# Patient Record
Sex: MALE | Race: WHITE | NOT HISPANIC OR LATINO | Employment: OTHER | URBAN - METROPOLITAN AREA
[De-identification: names, ages, dates, MRNs, and addresses within clinical notes are randomized per-mention and may not be internally consistent; named-entity substitution may affect disease eponyms.]

---

## 2017-01-30 ENCOUNTER — TRANSCRIBE ORDERS (OUTPATIENT)
Dept: ADMINISTRATIVE | Age: 72
End: 2017-01-30

## 2017-01-30 ENCOUNTER — HOSPITAL ENCOUNTER (OUTPATIENT)
Dept: RADIOLOGY | Age: 72
Discharge: HOME | End: 2017-01-30
Payer: MEDICARE

## 2017-01-30 DIAGNOSIS — J18.9 UNRESOLVED PNEUMONIA: ICD-10-CM

## 2017-01-30 DIAGNOSIS — J18.9 UNRESOLVED PNEUMONIA: Primary | ICD-10-CM

## 2017-01-30 PROCEDURE — 71020 HB CHEST X-RAY 2VW FRONTAL&LATL: CPT

## 2017-02-20 ENCOUNTER — APPOINTMENT (OUTPATIENT)
Dept: LAB | Age: 72
End: 2017-02-20
Payer: MEDICARE

## 2017-02-20 ENCOUNTER — TRANSCRIBE ORDERS (OUTPATIENT)
Dept: ADMINISTRATIVE | Age: 72
End: 2017-02-20

## 2017-02-20 DIAGNOSIS — C61 MALIGNANT NEOPLASM OF PROSTATE (HCC): ICD-10-CM

## 2017-02-20 LAB — PSA SERPL-MCNC: <0.1 NG/ML (ref 0–4)

## 2017-02-20 PROCEDURE — 84153 ASSAY OF PSA TOTAL: CPT

## 2017-02-20 PROCEDURE — 36415 COLL VENOUS BLD VENIPUNCTURE: CPT

## 2017-05-30 ENCOUNTER — TRANSCRIBE ORDERS (OUTPATIENT)
Dept: ADMINISTRATIVE | Age: 72
End: 2017-05-30

## 2017-05-30 ENCOUNTER — APPOINTMENT (OUTPATIENT)
Dept: LAB | Age: 72
End: 2017-05-30
Payer: MEDICARE

## 2017-05-30 DIAGNOSIS — C61 MALIGNANT NEOPLASM OF PROSTATE (HCC): ICD-10-CM

## 2017-05-30 LAB — PSA SERPL-MCNC: <0.1 NG/ML (ref 0–4)

## 2017-05-30 PROCEDURE — 36415 COLL VENOUS BLD VENIPUNCTURE: CPT

## 2017-05-30 PROCEDURE — 84153 ASSAY OF PSA TOTAL: CPT

## 2017-06-07 ENCOUNTER — GENERIC CONVERSION - ENCOUNTER (OUTPATIENT)
Dept: OTHER | Facility: OTHER | Age: 72
End: 2017-06-07

## 2017-06-07 ENCOUNTER — APPOINTMENT (OUTPATIENT)
Dept: RADIATION ONCOLOGY | Facility: HOSPITAL | Age: 72
End: 2017-06-07
Attending: RADIOLOGY
Payer: MEDICARE

## 2017-06-07 PROCEDURE — 99213 OFFICE O/P EST LOW 20 MIN: CPT | Performed by: RADIOLOGY

## 2017-06-28 ENCOUNTER — TRANSCRIBE ORDERS (OUTPATIENT)
Dept: ADMINISTRATIVE | Facility: HOSPITAL | Age: 72
End: 2017-06-28

## 2017-06-28 DIAGNOSIS — K81.9 CHOLECYSTITIS, UNSPECIFIED: Primary | ICD-10-CM

## 2017-06-29 ENCOUNTER — HOSPITAL ENCOUNTER (OUTPATIENT)
Dept: RADIOLOGY | Age: 72
Discharge: HOME | End: 2017-06-29
Payer: MEDICARE

## 2017-06-29 DIAGNOSIS — K81.9 CHOLECYSTITIS, UNSPECIFIED: ICD-10-CM

## 2017-06-29 PROCEDURE — 76705 ECHO EXAM OF ABDOMEN: CPT

## 2017-07-31 ENCOUNTER — APPOINTMENT (OUTPATIENT)
Dept: LAB | Age: 72
End: 2017-07-31
Payer: MEDICARE

## 2017-07-31 ENCOUNTER — TRANSCRIBE ORDERS (OUTPATIENT)
Dept: ADMINISTRATIVE | Age: 72
End: 2017-07-31

## 2017-07-31 DIAGNOSIS — C61 MALIGNANT NEOPLASM OF PROSTATE (HCC): ICD-10-CM

## 2017-07-31 LAB — PSA SERPL-MCNC: <0.1 NG/ML (ref 0–4)

## 2017-07-31 PROCEDURE — 36415 COLL VENOUS BLD VENIPUNCTURE: CPT

## 2017-07-31 PROCEDURE — 84153 ASSAY OF PSA TOTAL: CPT

## 2017-08-03 ENCOUNTER — ALLSCRIPTS OFFICE VISIT (OUTPATIENT)
Dept: OTHER | Facility: OTHER | Age: 72
End: 2017-08-03

## 2017-11-30 DIAGNOSIS — C61 MALIGNANT NEOPLASM OF PROSTATE (HCC): ICD-10-CM

## 2017-12-08 ENCOUNTER — APPOINTMENT (OUTPATIENT)
Dept: LAB | Age: 72
End: 2017-12-08
Payer: MEDICARE

## 2017-12-08 ENCOUNTER — TRANSCRIBE ORDERS (OUTPATIENT)
Dept: ADMINISTRATIVE | Age: 72
End: 2017-12-08

## 2017-12-08 DIAGNOSIS — C61 MALIGNANT NEOPLASM OF PROSTATE (HCC): Primary | ICD-10-CM

## 2017-12-08 DIAGNOSIS — I10 ESSENTIAL HYPERTENSION, MALIGNANT: ICD-10-CM

## 2017-12-08 DIAGNOSIS — C61 MALIGNANT NEOPLASM OF PROSTATE (HCC): ICD-10-CM

## 2017-12-08 DIAGNOSIS — E78.5 HYPERLIPIDEMIA, UNSPECIFIED HYPERLIPIDEMIA TYPE: Primary | ICD-10-CM

## 2017-12-08 DIAGNOSIS — E78.5 HYPERLIPIDEMIA, UNSPECIFIED HYPERLIPIDEMIA TYPE: ICD-10-CM

## 2017-12-08 LAB
ALBUMIN SERPL BCP-MCNC: 3.4 G/DL (ref 3.5–5)
ALP SERPL-CCNC: 70 U/L (ref 46–116)
ALT SERPL W P-5'-P-CCNC: 23 U/L (ref 12–78)
ANION GAP SERPL CALCULATED.3IONS-SCNC: 4 MMOL/L (ref 4–13)
AST SERPL W P-5'-P-CCNC: 18 U/L (ref 5–45)
BILIRUB SERPL-MCNC: 0.47 MG/DL (ref 0.2–1)
BUN SERPL-MCNC: 15 MG/DL (ref 5–25)
CALCIUM SERPL-MCNC: 9.6 MG/DL (ref 8.3–10.1)
CHLORIDE SERPL-SCNC: 105 MMOL/L (ref 100–108)
CHOLEST SERPL-MCNC: 130 MG/DL (ref 50–200)
CO2 SERPL-SCNC: 30 MMOL/L (ref 21–32)
CREAT SERPL-MCNC: 0.94 MG/DL (ref 0.6–1.3)
ERYTHROCYTE [DISTWIDTH] IN BLOOD BY AUTOMATED COUNT: 12.8 % (ref 11.6–15.1)
GFR SERPL CREATININE-BSD FRML MDRD: 80 ML/MIN/1.73SQ M
GLUCOSE P FAST SERPL-MCNC: 85 MG/DL (ref 65–99)
HCT VFR BLD AUTO: 44.8 % (ref 36.5–49.3)
HDLC SERPL-MCNC: 48 MG/DL (ref 40–60)
HGB BLD-MCNC: 15.2 G/DL (ref 12–17)
LDLC SERPL CALC-MCNC: 65 MG/DL (ref 0–100)
MCH RBC QN AUTO: 30.6 PG (ref 26.8–34.3)
MCHC RBC AUTO-ENTMCNC: 33.9 G/DL (ref 31.4–37.4)
MCV RBC AUTO: 90 FL (ref 82–98)
PLATELET # BLD AUTO: 344 THOUSANDS/UL (ref 149–390)
PMV BLD AUTO: 10.5 FL (ref 8.9–12.7)
POTASSIUM SERPL-SCNC: 4.4 MMOL/L (ref 3.5–5.3)
PROT SERPL-MCNC: 7 G/DL (ref 6.4–8.2)
PSA SERPL-MCNC: <0.1 NG/ML (ref 0–4)
RBC # BLD AUTO: 4.97 MILLION/UL (ref 3.88–5.62)
SODIUM SERPL-SCNC: 139 MMOL/L (ref 136–145)
TRIGL SERPL-MCNC: 84 MG/DL
WBC # BLD AUTO: 8.71 THOUSAND/UL (ref 4.31–10.16)

## 2017-12-08 PROCEDURE — 80061 LIPID PANEL: CPT

## 2017-12-08 PROCEDURE — 84153 ASSAY OF PSA TOTAL: CPT

## 2017-12-08 PROCEDURE — 85027 COMPLETE CBC AUTOMATED: CPT

## 2017-12-08 PROCEDURE — 80053 COMPREHEN METABOLIC PANEL: CPT

## 2017-12-08 PROCEDURE — 36415 COLL VENOUS BLD VENIPUNCTURE: CPT

## 2017-12-11 ENCOUNTER — GENERIC CONVERSION - ENCOUNTER (OUTPATIENT)
Dept: OTHER | Facility: OTHER | Age: 72
End: 2017-12-11

## 2017-12-11 ENCOUNTER — APPOINTMENT (OUTPATIENT)
Dept: RADIATION ONCOLOGY | Facility: HOSPITAL | Age: 72
End: 2017-12-11
Attending: RADIOLOGY
Payer: MEDICARE

## 2017-12-11 PROCEDURE — 99213 OFFICE O/P EST LOW 20 MIN: CPT | Performed by: RADIOLOGY

## 2018-01-10 NOTE — MISCELLANEOUS
Message   Recorded as Task   Date: 07/28/2016 02:02 PM, Created By: Philip Cruz   Task Name: Follow Up   Assigned To: [address] [facility] Procedure,Team   Regarding Patient: Jordan Hdez, Status: Active   Comment:    Criss Davis - 28 Jul 2016 2:02 PM     TASK CREATED  L/m with wife to return call  S/p LESI L4-L5 done 7/22/16 by Dr Manav Morilloig - 29 Jul 2016 11:04 AM     TASK EDITED  Patient l/m on 7/28/16 @3:56pm stating that he received 70% relief from his procedure  His pain level is between2-3  S/p LESI L4-L5 done 7/22/16 by Dr Cruz Stuart  [address] - 29 Jul 2016 11:18 AM     TASK REPLIED TO: Previously Assigned To [place]  Thanks  Active Problems    1  Chronic low back pain (724 2,338 29) (M54 5,G89 29)   2  Chronic pain syndrome (338 4) (G89 4)   3  Lumbar radiculopathy (724 4) (M54 16)   4  Lumbar spinal stenosis (724 02) (M48 06)   5  Prostate cancer (185) (C61)   6  Spondylolisthesis of lumbar region (738 4) (M43 16)   7  Stress incontinence (N39 3)    Current Meds   1  Losartan Potassium 50 MG Oral Tablet; Therapy: (Recorded:13Hwu3896) to Recorded    Allergies    1   No Known Drug Allergies    Signatures   Electronically signed by : Jannet Escalona MA; Aug  1 2016  2:23PM EST                       (Author)

## 2018-01-12 NOTE — MISCELLANEOUS
Message   Recorded as Task   Date: 09/19/2016 03:57 PM, Created By: Kandice Grimaldo   Task Name: Care Coordination   Assigned To: [address] [facility] Clinical,Team   Regarding Patient: Barber Goyal, Status: Active   Comment:    Ariela Zhao - 19 Sep 2016 3:57 PM     TASK CREATED  pt LM on [facility] procedure line stating he is trying to fax pain diary and it won't go through  Pt asking for a return call, [phone]  May Ramos - 19 Sep 2016 4:13 PM     TASK EDITED     Spoke to pt, he stated that the injection did not work  States approx 20% decrease in pain that lasted most of the afternoon  Pt will fax the pain diary to the [address] [place] [address] office  Number provided  May Ramos - 21 Sep 2016 3:50 PM     TASK EDITED     Pain diary received & scanned  Please review  Rosita Masters - 21 Sep 2016 4:21 PM     TASK REPLIED TO: Previously Assigned To Vlad Olivera  The pain diary lists some times of the day when he had 60% relief and other times when he had far less  Does he want to proceed with MBB #2? May Ramos - 22 Sep 2016 4:08 PM     TASK EDITED     See other task addressed in [place] procedure team         Active Problems    1  Chronic low back pain (724 2,338 29) (M54 5,G89 29)   2  Chronic pain syndrome (338 4) (G89 4)   3  Lumbar radiculopathy (724 4) (M54 16)   4  Lumbar spinal stenosis (724 02) (M48 06)   5  Lumbar spondylosis (721 3) (M47 816)   6  Prostate cancer (185) (C61)   7  Spondylolisthesis of lumbar region (738 4) (M43 16)   8  Stress incontinence (N39 3)    Current Meds   1  Diclofenac Sodium 75 MG Oral Tablet Delayed Release; TAKE 1 TABLET Every twelve   hours PRN pain; Therapy: 55Ogo8760 to (Evaluate:62Myo8194)  Requested for: 51Kzr9656; Last   Rx:91Spk0318 Ordered   2  Losartan Potassium 50 MG Oral Tablet; Therapy: (Recorded:05Jan2016) to Recorded    Allergies    1   No Known Drug Allergies    Signatures   Electronically signed by : Maryuri Medel RN; Sep 22 2016 4:09PM EST                       (Author)

## 2018-01-13 NOTE — MISCELLANEOUS
Message   Recorded as Task   Date: 10/07/2016 08:41 AM, Created By: Coral Tsang   Task Name: Follow Up   Assigned To: [address] Clinical,Team   Regarding Patient: Kai Guzman, Status: Active   CommentJamal Mcdowell - 07 Oct 2016 8:41 AM     TASK CREATED  Patient reports he received zero relief from his procedure  I asked if  got any relief even for the 1st hour after procedure and he said no  He stated that "it didn't help him at all"  S/p B/L L3  L4  L5  S1 MBB # 2 done 9/30/16  Mckayla Keene - 07 Oct 2016 9:33 AM     TASK REPLIED TO: Previously Assigned To [address] Clinical,Team  His pain diary shows that he did get good relief for a few hours  I am going to task this to [address] [place] [address] to qualify his pain relief better  May Ramos - 10 Oct 2016 9:22 AM     TASK EDITED   Spoke to pt, he states that at the bottom of the pain diary he put a note stating that it did not do anything for his pain  Told pt i would let Dr Olivear know and call him with further recommendations  Pt has a SOVS scheduled for 10/25/16  Do you want this moved up sooner to discuss? Mckayla Keene - 10 Oct 2016 9:45 AM     TASK REPLIED TO: Previously Assigned To Vlad Olivera  Please move up follow up appointment to discuss  May Ramos - 10 Oct 2016 10:08 AM     TASK EDITED       Pt scheduled for 10/11/16  Active Problems    1  Chronic low back pain (724 2,338 29) (M54 5,G89 29)   2  Chronic pain syndrome (338 4) (G89 4)   3  Lumbar radiculopathy (724 4) (M54 16)   4  Lumbar spinal stenosis (724 02) (M48 06)   5  Lumbar spondylosis (721 3) (M47 816)   6  Prostate cancer (185) (C61)   7  Spondylolisthesis of lumbar region (738 4) (M43 16)   8  Stress incontinence (N39 3)    Current Meds   1  Diclofenac Sodium 75 MG Oral Tablet Delayed Release; TAKE 1 TABLET Every twelve   hours PRN pain; Therapy: 66Uce8204 to (Evaluate:45Kkg6245)  Requested for: 31Wrq6935; Last   Rx:[id number] Ordered   2   Gas-X CHEW; CHEW TABLET  PRN; Therapy: (Recorded:91Dli7741) to Recorded   3  Imodium A-D TABS; USE AS DIRECTED; Therapy: (Lo Bueno) to Recorded   4  Losartan Potassium 50 MG Oral Tablet; Therapy: (Recorded:05Jan2016) to Recorded    Allergies    1   No Known Drug Allergies    Signatures   Electronically signed by : Daniel Schrader RN; Oct 10 2016 10:08AM EST                       (Author)

## 2018-01-14 VITALS
DIASTOLIC BLOOD PRESSURE: 84 MMHG | WEIGHT: 192 LBS | BODY MASS INDEX: 25.45 KG/M2 | HEART RATE: 64 BPM | SYSTOLIC BLOOD PRESSURE: 120 MMHG | HEIGHT: 73 IN

## 2018-01-14 VITALS
HEART RATE: 60 BPM | TEMPERATURE: 97.8 F | RESPIRATION RATE: 18 BRPM | WEIGHT: 196 LBS | HEIGHT: 73 IN | OXYGEN SATURATION: 95 % | BODY MASS INDEX: 25.98 KG/M2 | DIASTOLIC BLOOD PRESSURE: 80 MMHG | SYSTOLIC BLOOD PRESSURE: 124 MMHG

## 2018-01-15 NOTE — MISCELLANEOUS
Message   Recorded as Task   Date: 10/06/2016 12:06 PM, Created By: Ck Upton   Task Name: Care Coordination   Assigned To: [facility] clerical,Team   Regarding Patient: Yesenia Hall, Status: In Progress   Comment:    Vlad Olivera - 06 Oct 2016 12:06 PM     TASK CREATED  Reviewed pain diaries  The patient appeared to get good relief from the MBB's  If he is interested, please schedule b/l RFA's  Thanks  Carey Vines - 07 Oct 2016 11:20 AM     TASK EDITED  Left message for patient to call back   Monse 85 Oct 2016 11:20 AM     TASK IN PROGRESS   Carey Vines - 07 Oct 2016 1:20 PM     TASK EDITED  S/W patient - patient requesting a office visit with Dr Fatoumata Chávez prior to scheduling the RFA's - please call patient to schedule an office visit - thank you   Natasha Graham - 10 Oct 2016 3:57 PM     TASK EDITED  PT IS SCHEDULED FOR 10/11/16 AT 3:15        Active Problems    1  Chronic low back pain (724 2,338 29) (M54 5,G89 29)   2  Chronic pain syndrome (338 4) (G89 4)   3  Lumbar radiculopathy (724 4) (M54 16)   4  Lumbar spinal stenosis (724 02) (M48 06)   5  Lumbar spondylosis (721 3) (M47 816)   6  Prostate cancer (185) (C61)   7  Spondylolisthesis of lumbar region (738 4) (M43 16)   8  Stress incontinence (N39 3)    Current Meds   1  Diclofenac Sodium 75 MG Oral Tablet Delayed Release; TAKE 1 TABLET Every twelve   hours PRN pain; Therapy: 26Cvl2755 to (Evaluate:68Fyy4332)  Requested for: 17Ovp9779; Last   Rx:94Opy2510 Ordered   2  Gas-X CHEW; CHEW TABLET  PRN; Therapy: (Recorded:33Zjm9074) to Recorded   3  Imodium A-D TABS; USE AS DIRECTED; Therapy: (Nadine Antonio) to Recorded   4  Losartan Potassium 50 MG Oral Tablet; Therapy: (Recorded:05Jan2016) to Recorded    Allergies    1  No Known Drug Allergies    Signatures   Electronically signed by :  Elias Coleman, ; Oct 10 2016  3:57PM EST                       (Author)

## 2018-01-15 NOTE — PROGRESS NOTES
Discussion/Summary  Social Work-Discussion Summary  Luke: Patient is being seen for a distress screenning assessment  LSW reviewed pt's distress thermometer completed by pt on 8/19/2016 in rad onc  Pt rated their distress as a 3/10 and denied psychosocial problems  Based on pt's score, a social work follow up would not be indicated  If pt expresses psychosocial needs, LSW is available to provide cancer care counseling        Signatures   Electronically signed by : OTTO Hathaway; Aug 22 2016  9:57AM EST                       (Author)

## 2018-01-16 NOTE — MISCELLANEOUS
Message   Recorded as Task   Date: 09/22/2016 02:16 PM, Created By: Michael Duenas   Task Name: Follow Up   Assigned To: [address] Procedure,Team   Regarding Patient: Barber Goyal, Status: In Progress   Comment:    RyanCriss - 22 Sep 2016 2:16 PM     TASK CREATED  Patient reports he received 60% relief on the left side of his back and 505 relief on the right side of his back  Patient will proceed with MBB#2 if that's what you recommend  S/p B/L L3  L4  L5  S1 MBB done 9/16/16  Rosita Masters - 22 Sep 2016 3:46 PM     TASK REPLIED TO: Previously Assigned To [address] Procedure,Team  I believe MBB #2 is reasonable at this point  RyanCriss - 23 Sep 2016 7:13 AM     TASK EDITED   RyanCriss - 23 Sep 2016 8:56 AM     TASK EDITED   Patient agreed to proceed with MBB#2   Rosita Lei - 23 Sep 2016 10:18 AM     TASK REPLIED TO: Previously Assigned To [place]  Giovanna  Active Problems    1  Chronic low back pain (724 2,338 29) (M54 5,G89 29)   2  Chronic pain syndrome (338 4) (G89 4)   3  Lumbar radiculopathy (724 4) (M54 16)   4  Lumbar spinal stenosis (724 02) (M48 06)   5  Lumbar spondylosis (721 3) (M47 816)   6  Prostate cancer (185) (C61)   7  Spondylolisthesis of lumbar region (738 4) (M43 16)   8  Stress incontinence (N39 3)    Current Meds   1  Diclofenac Sodium 75 MG Oral Tablet Delayed Release; TAKE 1 TABLET Every twelve   hours PRN pain; Therapy: 55Qkk8005 to (Evaluate:34Cnx9037)  Requested for: 28Fux5749; Last   Rx:85Gfy0224 Ordered   2  Gas-X CHEW; CHEW TABLET  PRN; Therapy: (Recorded:59Wmr9894) to Recorded   3  Imodium A-D TABS; USE AS DIRECTED; Therapy: (Cira Coon) to Recorded   4  Losartan Potassium 50 MG Oral Tablet; Therapy: (Recorded:05Jan2016) to Recorded    Allergies    1   No Known Drug Allergies    Signatures   Electronically signed by : Jake Garsia MA; Oct  6 2016  2:49PM EST                       (Author)

## 2018-01-23 VITALS
OXYGEN SATURATION: 96 % | HEIGHT: 73 IN | WEIGHT: 192.31 LBS | TEMPERATURE: 97.7 F | DIASTOLIC BLOOD PRESSURE: 72 MMHG | RESPIRATION RATE: 16 BRPM | BODY MASS INDEX: 25.49 KG/M2 | SYSTOLIC BLOOD PRESSURE: 132 MMHG | HEART RATE: 63 BPM

## 2018-02-03 DIAGNOSIS — C61 MALIGNANT NEOPLASM OF PROSTATE (HCC): ICD-10-CM

## 2018-02-16 PROBLEM — C61 PROSTATE CANCER (HCC): Status: ACTIVE | Noted: 2018-02-16

## 2018-02-27 ENCOUNTER — OFFICE VISIT (OUTPATIENT)
Dept: UROLOGY | Facility: CLINIC | Age: 73
End: 2018-02-27
Payer: MEDICARE

## 2018-02-27 VITALS
HEART RATE: 64 BPM | HEIGHT: 73 IN | BODY MASS INDEX: 25.71 KG/M2 | WEIGHT: 194 LBS | SYSTOLIC BLOOD PRESSURE: 150 MMHG | DIASTOLIC BLOOD PRESSURE: 90 MMHG

## 2018-02-27 DIAGNOSIS — N39.3 STRESS INCONTINENCE: ICD-10-CM

## 2018-02-27 DIAGNOSIS — C61 PROSTATE CANCER (HCC): Primary | ICD-10-CM

## 2018-02-27 PROCEDURE — 99213 OFFICE O/P EST LOW 20 MIN: CPT | Performed by: PHYSICIAN ASSISTANT

## 2018-02-27 RX ORDER — PRAVASTATIN SODIUM 20 MG
20 TABLET ORAL
Refills: 3 | COMMUNITY
Start: 2017-12-05

## 2018-02-27 RX ORDER — TRAMADOL HYDROCHLORIDE 50 MG/1
TABLET ORAL
Refills: 3 | COMMUNITY
Start: 2017-11-28
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
## 2018-06-01 DIAGNOSIS — C61 MALIGNANT NEOPLASM OF PROSTATE (HCC): ICD-10-CM

## 2018-06-27 ENCOUNTER — APPOINTMENT (OUTPATIENT)
Dept: LAB | Age: 73
End: 2018-06-27
Payer: MEDICARE

## 2018-06-27 ENCOUNTER — TRANSCRIBE ORDERS (OUTPATIENT)
Dept: ADMINISTRATIVE | Age: 73
End: 2018-06-27

## 2018-06-27 DIAGNOSIS — C61 PROSTATE CANCER (HCC): ICD-10-CM

## 2018-06-27 LAB — PSA SERPL-MCNC: <0.1 NG/ML (ref 0–4)

## 2018-06-27 PROCEDURE — 36415 COLL VENOUS BLD VENIPUNCTURE: CPT

## 2018-06-27 PROCEDURE — 84153 ASSAY OF PSA TOTAL: CPT

## 2018-07-18 ENCOUNTER — CLINICAL SUPPORT (OUTPATIENT)
Dept: RADIATION ONCOLOGY | Facility: HOSPITAL | Age: 73
End: 2018-07-18
Payer: MEDICARE

## 2018-07-18 ENCOUNTER — RADIATION ONCOLOGY FOLLOW-UP (OUTPATIENT)
Dept: RADIATION ONCOLOGY | Facility: HOSPITAL | Age: 73
End: 2018-07-18
Attending: RADIOLOGY
Payer: MEDICARE

## 2018-07-18 VITALS
WEIGHT: 193.4 LBS | DIASTOLIC BLOOD PRESSURE: 88 MMHG | TEMPERATURE: 97.6 F | RESPIRATION RATE: 14 BRPM | OXYGEN SATURATION: 96 % | HEIGHT: 73 IN | BODY MASS INDEX: 25.63 KG/M2 | SYSTOLIC BLOOD PRESSURE: 146 MMHG | HEART RATE: 64 BPM

## 2018-07-18 DIAGNOSIS — C61 PROSTATE CANCER (HCC): Primary | ICD-10-CM

## 2018-07-18 PROCEDURE — 99214 OFFICE O/P EST MOD 30 MIN: CPT | Performed by: RADIOLOGY
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
## 2018-08-29 ENCOUNTER — OFFICE VISIT (OUTPATIENT)
Dept: UROLOGY | Facility: CLINIC | Age: 73
End: 2018-08-29
Payer: MEDICARE

## 2018-08-29 VITALS
BODY MASS INDEX: 25.82 KG/M2 | HEART RATE: 68 BPM | WEIGHT: 194.8 LBS | DIASTOLIC BLOOD PRESSURE: 92 MMHG | SYSTOLIC BLOOD PRESSURE: 160 MMHG | HEIGHT: 73 IN

## 2018-08-29 DIAGNOSIS — C61 PROSTATE CANCER (HCC): Primary | ICD-10-CM

## 2018-08-29 PROCEDURE — 99213 OFFICE O/P EST LOW 20 MIN: CPT | Performed by: UROLOGY

## 2019-01-14 ENCOUNTER — APPOINTMENT (OUTPATIENT)
Dept: LAB | Age: 74
End: 2019-01-14
Payer: MEDICARE

## 2019-01-14 DIAGNOSIS — C61 PROSTATE CANCER (HCC): ICD-10-CM

## 2019-01-14 LAB — PSA SERPL-MCNC: <0.1 NG/ML (ref 0–4)

## 2019-01-14 PROCEDURE — 84153 ASSAY OF PSA TOTAL: CPT

## 2019-01-16 ENCOUNTER — CLINICAL SUPPORT (OUTPATIENT)
Dept: RADIATION ONCOLOGY | Facility: HOSPITAL | Age: 74
End: 2019-01-16

## 2019-01-16 DIAGNOSIS — C61 PROSTATE CANCER (HCC): Primary | ICD-10-CM

## 2019-02-26 ENCOUNTER — OFFICE VISIT (OUTPATIENT)
Dept: UROLOGY | Facility: CLINIC | Age: 74
End: 2019-02-26
Payer: MEDICARE

## 2019-02-26 VITALS
HEART RATE: 83 BPM | BODY MASS INDEX: 27.12 KG/M2 | HEIGHT: 72 IN | WEIGHT: 200.2 LBS | DIASTOLIC BLOOD PRESSURE: 60 MMHG | SYSTOLIC BLOOD PRESSURE: 120 MMHG

## 2019-02-26 DIAGNOSIS — C61 PROSTATE CANCER (HCC): ICD-10-CM

## 2019-02-26 DIAGNOSIS — N39.3 STRESS INCONTINENCE: Primary | ICD-10-CM

## 2019-02-26 PROCEDURE — 99213 OFFICE O/P EST LOW 20 MIN: CPT | Performed by: PHYSICIAN ASSISTANT

## 2019-02-26 RX ORDER — NEBIVOLOL HYDROCHLORIDE 5 MG/1
5 TABLET ORAL DAILY
COMMUNITY
End: 2021-01-04

## 2019-02-26 RX ORDER — SODIUM PHOSPHATE,MONO-DIBASIC 19G-7G/118
500 ENEMA (ML) RECTAL 3 TIMES DAILY
COMMUNITY
End: 2021-01-04

## 2019-02-26 RX ORDER — MEDICAL SUPPLY, MISCELLANEOUS
EACH MISCELLANEOUS DAILY
Qty: 1 EACH | Refills: 6 | Status: SHIPPED | OUTPATIENT
Start: 2019-02-26

## 2019-02-26 RX ORDER — OMEPRAZOLE 20 MG/1
CAPSULE, DELAYED RELEASE ORAL
Refills: 1 | COMMUNITY
Start: 2019-01-20 | End: 2021-01-04
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
## 2019-06-26 ENCOUNTER — PREPPED CHART (OUTPATIENT)
Dept: URBAN - METROPOLITAN AREA CLINIC 6 | Facility: CLINIC | Age: 74
End: 2019-06-26
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
## 2019-08-24 ENCOUNTER — TRANSCRIBE ORDERS (OUTPATIENT)
Dept: ADMINISTRATIVE | Age: 74
End: 2019-08-24

## 2019-08-24 ENCOUNTER — APPOINTMENT (OUTPATIENT)
Dept: LAB | Age: 74
End: 2019-08-24
Payer: MEDICARE

## 2019-08-24 DIAGNOSIS — E78.5 HYPERLIPIDEMIA, UNSPECIFIED HYPERLIPIDEMIA TYPE: ICD-10-CM

## 2019-08-24 DIAGNOSIS — E55.9 AVITAMINOSIS D: ICD-10-CM

## 2019-08-24 DIAGNOSIS — C61 PROSTATE CANCER (HCC): ICD-10-CM

## 2019-08-24 DIAGNOSIS — I10 ESSENTIAL HYPERTENSION: ICD-10-CM

## 2019-08-24 DIAGNOSIS — I10 ESSENTIAL HYPERTENSION: Primary | ICD-10-CM

## 2019-08-24 LAB
25(OH)D3 SERPL-MCNC: 50.2 NG/ML (ref 30–100)
ALBUMIN SERPL BCP-MCNC: 3.8 G/DL (ref 3.5–5)
ALP SERPL-CCNC: 56 U/L (ref 46–116)
ALT SERPL W P-5'-P-CCNC: 23 U/L (ref 12–78)
ANION GAP SERPL CALCULATED.3IONS-SCNC: 3 MMOL/L (ref 4–13)
AST SERPL W P-5'-P-CCNC: 17 U/L (ref 5–45)
BILIRUB SERPL-MCNC: 0.51 MG/DL (ref 0.2–1)
BUN SERPL-MCNC: 16 MG/DL (ref 5–25)
CALCIUM SERPL-MCNC: 9.5 MG/DL (ref 8.3–10.1)
CHLORIDE SERPL-SCNC: 106 MMOL/L (ref 100–108)
CHOLEST SERPL-MCNC: 163 MG/DL (ref 50–200)
CO2 SERPL-SCNC: 28 MMOL/L (ref 21–32)
CREAT SERPL-MCNC: 0.94 MG/DL (ref 0.6–1.3)
GFR SERPL CREATININE-BSD FRML MDRD: 79 ML/MIN/1.73SQ M
GLUCOSE P FAST SERPL-MCNC: 95 MG/DL (ref 65–99)
HDLC SERPL-MCNC: 61 MG/DL (ref 40–60)
LDLC SERPL CALC-MCNC: 68 MG/DL (ref 0–100)
NONHDLC SERPL-MCNC: 102 MG/DL
POTASSIUM SERPL-SCNC: 4.4 MMOL/L (ref 3.5–5.3)
PROT SERPL-MCNC: 7.1 G/DL (ref 6.4–8.2)
PSA SERPL-MCNC: <0.1 NG/ML (ref 0–4)
SODIUM SERPL-SCNC: 137 MMOL/L (ref 136–145)
TRIGL SERPL-MCNC: 168 MG/DL

## 2019-08-24 PROCEDURE — 80053 COMPREHEN METABOLIC PANEL: CPT

## 2019-08-24 PROCEDURE — 36415 COLL VENOUS BLD VENIPUNCTURE: CPT

## 2019-08-24 PROCEDURE — 82306 VITAMIN D 25 HYDROXY: CPT

## 2019-08-24 PROCEDURE — 80061 LIPID PANEL: CPT

## 2019-08-24 PROCEDURE — 84153 ASSAY OF PSA TOTAL: CPT

## 2019-08-27 ENCOUNTER — OFFICE VISIT (OUTPATIENT)
Dept: UROLOGY | Facility: AMBULATORY SURGERY CENTER | Age: 74
End: 2019-08-27
Payer: MEDICARE

## 2019-08-27 VITALS
SYSTOLIC BLOOD PRESSURE: 136 MMHG | WEIGHT: 198 LBS | BODY MASS INDEX: 26.82 KG/M2 | DIASTOLIC BLOOD PRESSURE: 88 MMHG | HEIGHT: 72 IN | HEART RATE: 63 BPM

## 2019-08-27 DIAGNOSIS — C61 PROSTATE CANCER (HCC): Primary | ICD-10-CM

## 2019-08-27 PROCEDURE — 99214 OFFICE O/P EST MOD 30 MIN: CPT | Performed by: UROLOGY

## 2020-02-12 ENCOUNTER — APPOINTMENT (OUTPATIENT)
Dept: LAB | Age: 75
End: 2020-02-12
Payer: MEDICARE

## 2020-02-12 DIAGNOSIS — C61 PROSTATE CANCER (HCC): ICD-10-CM

## 2020-02-12 LAB — PSA SERPL-MCNC: <0.1 NG/ML (ref 0–4)

## 2020-02-12 PROCEDURE — 84153 ASSAY OF PSA TOTAL: CPT

## 2020-02-18 ENCOUNTER — OFFICE VISIT (OUTPATIENT)
Dept: UROLOGY | Facility: AMBULATORY SURGERY CENTER | Age: 75
End: 2020-02-18
Payer: MEDICARE

## 2020-02-18 VITALS
HEIGHT: 72 IN | WEIGHT: 198 LBS | BODY MASS INDEX: 26.82 KG/M2 | DIASTOLIC BLOOD PRESSURE: 60 MMHG | SYSTOLIC BLOOD PRESSURE: 110 MMHG | HEART RATE: 82 BPM

## 2020-02-18 DIAGNOSIS — C61 PROSTATE CANCER (HCC): Primary | ICD-10-CM

## 2020-02-18 DIAGNOSIS — N39.3 STRESS INCONTINENCE: ICD-10-CM

## 2020-02-18 PROCEDURE — 99214 OFFICE O/P EST MOD 30 MIN: CPT | Performed by: UROLOGY

## 2020-02-18 NOTE — ASSESSMENT & PLAN NOTE
I reviewed his PSA results  I was happy to report they are undetectable  He will follow up in 6 months with repeat testing

## 2020-02-18 NOTE — PROGRESS NOTES
Assessment/Plan:    Prostate cancer [facility])  I reviewed his PSA results  I was happy to report they are undetectable  He will follow up in 6 months with repeat testing  Stress incontinence  The patient is very interested in physical therapy and I placed an order for this  I did explain to him that I do not think that the physical therapy is going to make a significant difference this far out from surgery and radiation  We did have a lengthy discussion about other options including sling or artificial urinary sphincter placement  The patient is interested in artificial urinary sphincter placement later in the summer  Information was given and he will contact us if he wishes to schedule  Diagnoses and all orders for this visit:    Prostate cancer ([facility] [address] )  -     PSA Total, Diagnostic; Future    Stress incontinence  -     Ambulatory referral to Physical Therapy; Future          Total visit time was 25 minutes of which over 50% was spent on counseling  Subjective:     Patient ID: Fe Patricia is a 75 y o  male    71-year-old male presents for follow-up for prostate cancer status post prostatectomy and adjuvant radiation therapy  The patient has significant urinary incontinence and wears 5 pads daily  He has no other complaints  The following portions of the patient's history were reviewed and updated as appropriate: allergies, current medications, past family history, past medical history, past social history, past surgical history and problem list     Review of Systems   Constitutional: Negative  HENT: Negative  Eyes: Negative  Respiratory: Negative  Cardiovascular: Negative  Gastrointestinal: Negative  Endocrine: Negative  Genitourinary:        As noted per HPI   Musculoskeletal: Negative  Skin: Negative  Allergic/Immunologic: Negative  Neurological: Negative  Hematological: Negative  Psychiatric/Behavioral: Negative  Objective:    Physical Exam   Constitutional: He is oriented to person, place, and time  He appears well-developed and well-nourished  Neck: Normal range of motion  Cardiovascular: Intact distal pulses  Pulmonary/Chest: Effort normal    Abdominal: Soft  Bowel sounds are normal  He exhibits no distension and no mass  There is no tenderness  There is no rebound and no guarding  Musculoskeletal: Normal range of motion  Neurological: He is alert and oriented to person, place, and time  Skin: Skin is warm and dry  Psychiatric: He has a normal mood and affect  Vitals reviewed          Results  Lab Results   Component Value Date    PSA <0 1 02/12/2020    PSA <0 1 08/24/2019    PSA <0 1 01/14/2019     Lab Results   Component Value Date    GLUCOSE 72 12/08/2015    CALCIUM 9 5 08/24/2019     12/08/2015    K 4 4 08/24/2019    CO2 28 08/24/2019     08/24/2019    BUN 16 08/24/2019    CREATININE 0 94 08/24/2019     Lab Results   Component Value Date    WBC 8 71 12/08/2017    HGB 15 2 12/08/2017    HCT 44 8 12/08/2017    MCV 90 12/08/2017     12/08/2017       No results found for this or any previous visit (from the past 1 hour(s)) ]

## 2020-02-18 NOTE — ASSESSMENT & PLAN NOTE
The patient is very interested in physical therapy and I placed an order for this  I did explain to him that I do not think that the physical therapy is going to make a significant difference this far out from surgery and radiation  We did have a lengthy discussion about other options including sling or artificial urinary sphincter placement  The patient is interested in artificial urinary sphincter placement later in the summer  Information was given and he will contact us if he wishes to schedule

## 2020-02-19 ENCOUNTER — EVALUATION (OUTPATIENT)
Dept: PHYSICAL THERAPY | Facility: REHABILITATION | Age: 75
End: 2020-02-19
Payer: MEDICARE

## 2020-02-19 DIAGNOSIS — N39.46 MIXED STRESS AND URGE URINARY INCONTINENCE: Primary | ICD-10-CM

## 2020-02-19 PROCEDURE — 97530 THERAPEUTIC ACTIVITIES: CPT | Performed by: PHYSICAL THERAPIST

## 2020-02-19 PROCEDURE — 90913 BFB TRAINING EA ADDL 15 MIN: CPT | Performed by: PHYSICAL THERAPIST

## 2020-02-19 PROCEDURE — 90912 BFB TRAINING 1ST 15 MIN: CPT | Performed by: PHYSICAL THERAPIST

## 2020-02-19 PROCEDURE — 97161 PT EVAL LOW COMPLEX 20 MIN: CPT | Performed by: PHYSICAL THERAPIST

## 2020-02-19 NOTE — PROGRESS NOTES
Initial Evaluation    Today's Date: 2020  Patient name: Clarissa Moon  : 1945  MRN: [id number]  Referring provider: Loreto Agustin MD  Dx:  Encounter Diagnosis     ICD-10-CM    1  Mixed stress and urge urinary incontinence N39 46        Start Time:   Stop Time: 921  Total time in clinic (min): 62 minutes       Assessment  Assessment details: Clarissa Moon is a 75 y o  male who presents with decreased strength and urinary dysfunction  Due to these impairments, Patient has difficulty performing a/iadls, recreational activities and engaging in social activities  Patient's clinical presentation is consistent with their referring diagnosis of Mixed stress and urge urinary incontinence  (primary encounter diagnosis)    Patient would benefit from skilled physical therapy to address their aforementioned impairments, improve their level of function and to improve their overall quality of life  Impairments: abnormal muscle firing, abnormal muscle tone, activity intolerance, impaired physical strength, lacks appropriate home exercise program and poor body mechanics  Barriers to therapy: SX several years Ago the radiation   Understanding of Dx/Px/POC: excellent   Prognosis: fair    Goals  STGs to be met in 4 weeks:  Patient will be compliant with introductory HEP as prescribed  Pt will perform proper Kegel exercises   LTGs to be met by discharge:  Normalize findings on sEMG to indicate PFM strength average of at least 12uV and resting average < 2 5uV  Implements relaxation strategies on a daily basis  Pt will be compliant with comprehensive home exercise program for self management of condition           Plan  Patient would benefit from: PT eval and skilled physical therapy            Pelvic Floor Subjective     Surgery date:  then radiation in     PSA levels: 2 0 post sx       How many days with catheter: no catheter after radiation just sx    Chief complaint: leakage Occupation: retired     Exercise or regular fitness activities: yes bicycling     Activities you are avoiding due to symptoms: avoids hydrating with exercises     Bladder Health:    Frequency during the day 9x     During the night: 3    Frequency during the day: 6     During the night: 3 ; Saturation 20%    Time of day incontinence is worst: evening     Activities cause you to leak: Coughing, Strong urgency, Walking, Sneezing, Lifting and Sit to stand    Do you experience: Weak stream    Do you sit or stand to void? sit    Prior history of bladder health issues? yes    Fluid Intake:    What do you drink during the day? Water coffee tea beer     Do you restrict fluid intake to avoid leaking? Yes    Bowel Health:    Frequency of bowel movements? 1    [place] Stool Chart: Type 4    Sensation of incomplete emptying? no    Strain to empty? No    Stool softeners/laxatives? No    Sexual Health:    Are you sexually active: Yes ; Men, women, both? Women    Frequency of sexual intercourse? Occasional     Leakage during erections or orgasm? Yes    Prior history of sexual health issues? No    Difficulty achieving/maintaining erection? Yes    Avoid intercourse due to symptoms? No      Objective   Pelvic Floor Exam     General Perineum Exam:   perineum intact       Visual Inspection of Perineum:   Cotton swab test: non-tender  Cough reflex: cough reflex  Sensation: intact  Tenderness: unprovoked    SMEG Biofeedback   Sensor used: external sensors placed perianally  Recruitment: moderate  Holding ability: poor  Derecruitment: fairelevated             Precautions standard     Manual                                                     Exercise Diary  2/19       Pt voiding log 12 min ed   Anatomy ed        Telesis baseline  2 series   DFM ed on biofeedback       Telesis quick flicks  1 series        Telesis 5/10  1 series        Telesis 10/10 1 series Modalities

## 2020-02-26 ENCOUNTER — APPOINTMENT (OUTPATIENT)
Dept: PHYSICAL THERAPY | Facility: REHABILITATION | Age: 75
End: 2020-02-26
Payer: MEDICARE

## 2020-02-28 ENCOUNTER — OFFICE VISIT (OUTPATIENT)
Dept: PHYSICAL THERAPY | Facility: REHABILITATION | Age: 75
End: 2020-02-28
Payer: MEDICARE

## 2020-02-28 DIAGNOSIS — N39.46 MIXED STRESS AND URGE URINARY INCONTINENCE: Primary | ICD-10-CM

## 2020-02-28 PROCEDURE — 90913 BFB TRAINING EA ADDL 15 MIN: CPT | Performed by: PHYSICAL THERAPIST

## 2020-02-28 PROCEDURE — 97112 NEUROMUSCULAR REEDUCATION: CPT | Performed by: PHYSICAL THERAPIST

## 2020-02-28 PROCEDURE — 90912 BFB TRAINING 1ST 15 MIN: CPT | Performed by: PHYSICAL THERAPIST

## 2020-02-28 NOTE — PROGRESS NOTES
Daily Note     Today's date: 2020  Patient name: Mac Webster  : 1945  MRN: [id number]  Referring provider: Cm Metcalf MD  Dx:   Encounter Diagnosis     ICD-10-CM    1  Mixed stress and urge urinary incontinence N39 46        Start Time: 950  Stop Time: 1053  Total time in clinic (min): 63 minutes    Subjective: I have been working on my exercises  Objective: See treatment diary below      Assessment: Tolerated treatment well  Patient demonstrated fatigue post treatment, exhibited good technique with therapeutic exercises and would benefit from continued PT      Plan: Continue per plan of care  Progress treatment as tolerated  Precautions standard     Manual                                                     Exercise Diary         Pt voiding log Review 10 min new goals set         Telesis baseline  2 series          Telesis quick flicks  1 series        Telesis 5/10  1 series        Telesis 10/10 1 series                Standing Telesis 5/10  1 series  Modalities                                     Therapeutic activities included explanation of biofeedback findings, what this indicates regarding muscle activity and importance of HEP to work on improving motor control of pelvic floor

## 2020-03-04 ENCOUNTER — APPOINTMENT (OUTPATIENT)
Dept: PHYSICAL THERAPY | Facility: REHABILITATION | Age: 75
End: 2020-03-04
Payer: MEDICARE

## 2020-03-05 ENCOUNTER — OFFICE VISIT (OUTPATIENT)
Dept: PHYSICAL THERAPY | Facility: REHABILITATION | Age: 75
End: 2020-03-05
Payer: MEDICARE

## 2020-03-05 DIAGNOSIS — N39.46 MIXED STRESS AND URGE URINARY INCONTINENCE: Primary | ICD-10-CM

## 2020-03-05 PROCEDURE — 90912 BFB TRAINING 1ST 15 MIN: CPT | Performed by: PHYSICAL THERAPIST

## 2020-03-05 PROCEDURE — 90913 BFB TRAINING EA ADDL 15 MIN: CPT | Performed by: PHYSICAL THERAPIST

## 2020-03-05 PROCEDURE — 97530 THERAPEUTIC ACTIVITIES: CPT | Performed by: PHYSICAL THERAPIST

## 2020-03-06 NOTE — PROGRESS NOTES
Daily Note     Today's date: 3/5/2020  Patient name: Joel Sanchez  : 1945  MRN: [id number]  Referring provider: Mee White MD  Dx:   Encounter Diagnosis     ICD-10-CM    1  Mixed stress and urge urinary incontinence N39 46        Start Time: [address]  Stop Time:   Total time in clinic (min): 59 minutes    Subjective: I feel I am starting to improve  Objective: See treatment diary below      Assessment: Tolerated treatment well  Increased Kegel to practicing with functional mobility with transfers  demonstrated fatigue post treatment, exhibited good technique with therapeutic exercises and would benefit from continued PT      Plan: Continue per plan of care  Progress treatment as tolerated  Precautions standard     Manual                                                   Exercise Diary  3/5       Pt voiding log LB set new voiding log goals  Educated on [employer] baseline  1 series          Telesis quick flicks  1 series        Telesis 5/10  1 series        Telesis 10/10 1 series                Standing Telesis 5/10  1 series  Standing  End 10/10 1 series        Sit to stands w/ Kegel hold  15x        Sit to stand w/ squat Kegel hold  10x                                                                                            Modalities                                     Therapeutic activities included explanation of biofeedback findings, what this indicates regarding muscle activity and importance of HEP to work on improving motor control of pelvic floor

## 2020-03-11 ENCOUNTER — OFFICE VISIT (OUTPATIENT)
Dept: PHYSICAL THERAPY | Facility: REHABILITATION | Age: 75
End: 2020-03-11
Payer: MEDICARE

## 2020-03-11 DIAGNOSIS — N39.46 MIXED STRESS AND URGE URINARY INCONTINENCE: Primary | ICD-10-CM

## 2020-03-11 PROCEDURE — 90912 BFB TRAINING 1ST 15 MIN: CPT | Performed by: PHYSICAL THERAPIST

## 2020-03-11 PROCEDURE — 90913 BFB TRAINING EA ADDL 15 MIN: CPT | Performed by: PHYSICAL THERAPIST

## 2020-03-11 NOTE — PROGRESS NOTES
Daily Note     Today's date: 3/11/2020  Patient name: Jimenez Diop  : 1945  MRN: [id number]  Referring provider: Maria Del Carmen Sifuentes MD  Dx:   Encounter Diagnosis     ICD-10-CM    1  Mixed stress and urge urinary incontinence N39 46        Start Time: 0800  Stop Time: 0858  Total time in clinic (min): 58 minutes    Subjective:I feel like we are on the right track  Objective: See treatment diary below      Assessment: Tolerated treatment well  Patient demonstrated fatigue post treatment, exhibited good technique with therapeutic exercises and would benefit from continued PT      Plan: Continue per plan of care  Progress treatment as tolerated  Precautions standard     Manual                                                   Exercise Diary  3/11       Pt voiding log LB set new voiding log goals  Reviewed goal of getting to 2 URGE         Telesis baseline  1 series          Telesis quick flicks  1 series        Telesis 5/10  1 series        Telesis 10/10 1 series                Standing Telesis 5/10  1 series  Standing  End 10/10 1 series        Sit to stands w/ Kegel hold  15x        Sit to stand w/ squat Kegel hold  10x                                                                                            Modalities                                     Therapeutic activities included explanation of biofeedback findings, what this indicates regarding muscle activity and importance of HEP to work on improving motor control of pelvic floor

## 2020-03-18 ENCOUNTER — OFFICE VISIT (OUTPATIENT)
Dept: PHYSICAL THERAPY | Facility: REHABILITATION | Age: 75
End: 2020-03-18
Payer: MEDICARE

## 2020-03-18 DIAGNOSIS — N39.46 MIXED STRESS AND URGE URINARY INCONTINENCE: Primary | ICD-10-CM

## 2020-03-18 PROCEDURE — 90913 BFB TRAINING EA ADDL 15 MIN: CPT | Performed by: PHYSICAL THERAPIST

## 2020-03-18 PROCEDURE — 97530 THERAPEUTIC ACTIVITIES: CPT | Performed by: PHYSICAL THERAPIST

## 2020-03-18 PROCEDURE — 90912 BFB TRAINING 1ST 15 MIN: CPT | Performed by: PHYSICAL THERAPIST

## 2020-03-18 NOTE — PROGRESS NOTES
Daily Note     Today's date: 3/18/2020  Patient name: Janny Abreu  : 1945  MRN: [id number]  Referring provider: Lula Mccormack MD  Dx:   Encounter Diagnosis     ICD-10-CM    1  Mixed stress and urge urinary incontinence N39 46        Start Time: 906  Stop Time: 1002  Total time in clinic (min): 56 minutes    Subjective: I am down to 1 pad still a few dribbles here and there especially when I go from sitting to standing  Objective: See treatment diary below      Assessment: Tolerated treatment well  Pt presented with increased tone greaer than 20uV  Increased patient education on frequency of HEP and PFM relaxation was given  Patient demonstrated fatigue post treatment, exhibited good technique with therapeutic exercises and would benefit from continued PT      Plan: Continue per plan of care  Progress treatment as tolerated  Precautions standard     Manual  3/18       PFM TPR  LB       Abdominal SMT mobilization  LB                                 Exercise Diary  3/11 3/118      Pt voiding log LB set new voiding log goals  Reviewed goal of getting to 2 URGE   Discussed PFM increased tone reported increased feeling of tightness or feeling his muscle was stronger       Telesis baseline  1 series    2 series then STM to decrease tone       Telesis quick flicks  1 series        Telesis 5/10  1 series  2 series 1 supine standing      Telesis 10/10 1 series  2 series 1 supine ]1 standing               Standing Telesis 5/10  1 series          Standing  End 10/10 1 series        Sit to stands w/ Kegel hold  15x  2x10 Telesis baseline  Kegel       Sit to stand w/ squat Kegel hold  10x                                                                                            Modalities                                     Therapeutic activities included explanation of biofeedback findings, what this indicates regarding muscle activity and importance of HEP to work on improving motor control of pelvic floor  Ed on increased PFM tone

## 2020-03-27 ENCOUNTER — OFFICE VISIT (OUTPATIENT)
Dept: PHYSICAL THERAPY | Facility: REHABILITATION | Age: 75
End: 2020-03-27
Payer: MEDICARE

## 2020-03-27 DIAGNOSIS — N39.46 MIXED STRESS AND URGE URINARY INCONTINENCE: Primary | ICD-10-CM

## 2020-03-27 PROCEDURE — 90912 BFB TRAINING 1ST 15 MIN: CPT | Performed by: PHYSICAL THERAPIST

## 2020-03-27 PROCEDURE — 97530 THERAPEUTIC ACTIVITIES: CPT | Performed by: PHYSICAL THERAPIST

## 2020-03-27 PROCEDURE — 90913 BFB TRAINING EA ADDL 15 MIN: CPT | Performed by: PHYSICAL THERAPIST

## 2020-03-27 NOTE — PROGRESS NOTES
Daily Note     Today's date: 3/27/2020  Patient name: Tej Reeves  : 1945  MRN: [id number]  Referring provider: Glenn Smith MD  Dx:   Encounter Diagnosis     ICD-10-CM    1  Mixed stress and urge urinary incontinence N39 46        Start Time: 830  Stop Time: 926  Total time in clinic (min): 56 minutes    Subjective: I fell like I am doing very well using less pads  Objective: See treatment diary below      Assessment: Tolerated treatment well  Patient demonstrated fatigue post treatment, exhibited good technique with therapeutic exercises and would benefit from continued PT      Plan: Continue per plan of care  Progress treatment as tolerated  Precautions standard     Manual  3/18       PFM TPR  LB       Abdominal SMT mobilization  LB                                 Exercise Diary  3/11 3/118 3/27     Pt voiding log LB set new voiding log goals  Reviewed goal of getting to 2 URGE   Discussed PFM increased tone reported increased feeling of tightness or feeling his muscle was stronger  LB      Telesis baseline  1 series    2 series then STM to decrease tone  1 series tone below 3uV      Telesis quick flicks  1 series   1 series vc to downtrain PFM      Telesis 5/10  1 series  2 series 1 supine standing 1 series vc to steady PFM contraction     Telesis 10/10 1 series  2 series 1 supine ]1 standing               Standing Telesis 5/10  1 series     Standing  1 series      Standing  End 10/10 1 series   Sit to stand 5 " hold 5" after hold     2x10      Sit to stands w/ Kegel hold  15x  2x10 Telesis baseline  Kegel  2 series sit to stand 10x marches   5# RLE  5# LLE       Sit to stand w/ squat Kegel hold  10x                                                                                            Modalities                                     Therapeutic activities included explanation of biofeedback findings, what this indicates regarding muscle activity and importance of HEP to work on improving motor control of pelvic floor      Telesis scanned into chart

## 2020-04-02 ENCOUNTER — TELEMEDICINE (OUTPATIENT)
Dept: PHYSICAL THERAPY | Facility: REHABILITATION | Age: 75
End: 2020-04-02
Payer: MEDICARE

## 2020-04-02 DIAGNOSIS — N39.46 MIXED STRESS AND URGE URINARY INCONTINENCE: Primary | ICD-10-CM

## 2020-04-02 PROCEDURE — 97530 THERAPEUTIC ACTIVITIES: CPT | Performed by: PHYSICAL THERAPIST

## 2020-04-09 ENCOUNTER — TELEMEDICINE (OUTPATIENT)
Dept: PHYSICAL THERAPY | Facility: REHABILITATION | Age: 75
End: 2020-04-09
Payer: MEDICARE

## 2020-04-09 DIAGNOSIS — N39.46 MIXED STRESS AND URGE URINARY INCONTINENCE: Primary | ICD-10-CM

## 2020-04-09 PROCEDURE — 97112 NEUROMUSCULAR REEDUCATION: CPT | Performed by: PHYSICAL THERAPIST

## 2020-04-09 PROCEDURE — 97110 THERAPEUTIC EXERCISES: CPT | Performed by: PHYSICAL THERAPIST

## 2020-04-16 ENCOUNTER — TELEMEDICINE (OUTPATIENT)
Dept: PHYSICAL THERAPY | Facility: REHABILITATION | Age: 75
End: 2020-04-16
Payer: MEDICARE

## 2020-04-16 DIAGNOSIS — N39.46 MIXED STRESS AND URGE URINARY INCONTINENCE: Primary | ICD-10-CM

## 2020-04-16 PROCEDURE — 97110 THERAPEUTIC EXERCISES: CPT | Performed by: PHYSICAL THERAPIST

## 2020-04-16 PROCEDURE — 97530 THERAPEUTIC ACTIVITIES: CPT | Performed by: PHYSICAL THERAPIST

## 2020-04-16 PROCEDURE — 97112 NEUROMUSCULAR REEDUCATION: CPT | Performed by: PHYSICAL THERAPIST

## 2020-04-23 ENCOUNTER — TELEMEDICINE (OUTPATIENT)
Dept: PHYSICAL THERAPY | Facility: REHABILITATION | Age: 75
End: 2020-04-23
Payer: MEDICARE

## 2020-04-23 DIAGNOSIS — N39.46 MIXED STRESS AND URGE URINARY INCONTINENCE: Primary | ICD-10-CM

## 2020-04-23 PROCEDURE — 97110 THERAPEUTIC EXERCISES: CPT | Performed by: PHYSICAL THERAPIST

## 2020-04-23 PROCEDURE — 97112 NEUROMUSCULAR REEDUCATION: CPT | Performed by: PHYSICAL THERAPIST

## 2020-04-29 ENCOUNTER — APPOINTMENT (OUTPATIENT)
Dept: PHYSICAL THERAPY | Facility: REHABILITATION | Age: 75
End: 2020-04-29
Payer: MEDICARE

## 2020-04-30 ENCOUNTER — APPOINTMENT (OUTPATIENT)
Dept: PHYSICAL THERAPY | Facility: REHABILITATION | Age: 75
End: 2020-04-30
Payer: MEDICARE

## 2020-05-05 ENCOUNTER — OFFICE VISIT (OUTPATIENT)
Dept: PHYSICAL THERAPY | Facility: REHABILITATION | Age: 75
End: 2020-05-05
Payer: MEDICARE

## 2020-05-05 DIAGNOSIS — N39.46 MIXED STRESS AND URGE URINARY INCONTINENCE: Primary | ICD-10-CM

## 2020-05-05 PROCEDURE — 90912 BFB TRAINING 1ST 15 MIN: CPT | Performed by: PHYSICAL THERAPIST

## 2020-05-05 PROCEDURE — 97140 MANUAL THERAPY 1/> REGIONS: CPT | Performed by: PHYSICAL THERAPIST

## 2020-05-05 PROCEDURE — 90913 BFB TRAINING EA ADDL 15 MIN: CPT | Performed by: PHYSICAL THERAPIST

## 2020-05-19 ENCOUNTER — OFFICE VISIT (OUTPATIENT)
Dept: PHYSICAL THERAPY | Facility: REHABILITATION | Age: 75
End: 2020-05-19
Payer: MEDICARE

## 2020-05-19 DIAGNOSIS — N39.46 MIXED STRESS AND URGE URINARY INCONTINENCE: Primary | ICD-10-CM

## 2020-05-19 PROCEDURE — 90912 BFB TRAINING 1ST 15 MIN: CPT | Performed by: PHYSICAL THERAPIST

## 2020-05-19 PROCEDURE — 97112 NEUROMUSCULAR REEDUCATION: CPT | Performed by: PHYSICAL THERAPIST

## 2020-05-19 PROCEDURE — 97110 THERAPEUTIC EXERCISES: CPT | Performed by: PHYSICAL THERAPIST

## 2020-06-16 ENCOUNTER — OFFICE VISIT (OUTPATIENT)
Dept: PHYSICAL THERAPY | Facility: REHABILITATION | Age: 75
End: 2020-06-16
Payer: MEDICARE

## 2020-06-16 DIAGNOSIS — N39.46 MIXED STRESS AND URGE URINARY INCONTINENCE: Primary | ICD-10-CM

## 2020-06-16 PROCEDURE — 97110 THERAPEUTIC EXERCISES: CPT | Performed by: PHYSICAL THERAPIST

## 2020-06-16 PROCEDURE — 90912 BFB TRAINING 1ST 15 MIN: CPT | Performed by: PHYSICAL THERAPIST

## 2020-06-16 PROCEDURE — 90913 BFB TRAINING EA ADDL 15 MIN: CPT | Performed by: PHYSICAL THERAPIST

## 2020-06-16 PROCEDURE — 97530 THERAPEUTIC ACTIVITIES: CPT | Performed by: PHYSICAL THERAPIST

## 2020-06-24 ENCOUNTER — EVALUATION (OUTPATIENT)
Dept: PHYSICAL THERAPY | Facility: REHABILITATION | Age: 75
End: 2020-06-24
Payer: MEDICARE

## 2020-06-24 DIAGNOSIS — S76.211A STRAIN OF ADDUCTOR MAGNUS MUSCLE OF RIGHT LOWER EXTREMITY, INITIAL ENCOUNTER: Primary | ICD-10-CM

## 2020-06-24 PROCEDURE — 97140 MANUAL THERAPY 1/> REGIONS: CPT | Performed by: PHYSICAL THERAPIST

## 2020-06-24 PROCEDURE — 97161 PT EVAL LOW COMPLEX 20 MIN: CPT | Performed by: PHYSICAL THERAPIST

## 2020-06-29 ENCOUNTER — TRANSCRIBE ORDERS (OUTPATIENT)
Dept: ADMINISTRATIVE | Age: 75
End: 2020-06-29

## 2020-06-29 ENCOUNTER — APPOINTMENT (OUTPATIENT)
Dept: LAB | Age: 75
End: 2020-06-29
Payer: MEDICARE

## 2020-06-29 ENCOUNTER — OFFICE VISIT (OUTPATIENT)
Dept: PHYSICAL THERAPY | Facility: REHABILITATION | Age: 75
End: 2020-06-29
Payer: MEDICARE

## 2020-06-29 DIAGNOSIS — I10 ESSENTIAL HYPERTENSION: Primary | ICD-10-CM

## 2020-06-29 DIAGNOSIS — K56.609 PEPTIC ULCER WITHOUT HEMORRHAGE OR PERFORATION BUT WITH OBSTRUCTION (HCC): ICD-10-CM

## 2020-06-29 DIAGNOSIS — C61 MALIGNANT NEOPLASM OF PROSTATE (HCC): ICD-10-CM

## 2020-06-29 DIAGNOSIS — S76.211A STRAIN OF ADDUCTOR MAGNUS MUSCLE OF RIGHT LOWER EXTREMITY, INITIAL ENCOUNTER: Primary | ICD-10-CM

## 2020-06-29 DIAGNOSIS — K27.9 PEPTIC ULCER WITHOUT HEMORRHAGE OR PERFORATION BUT WITH OBSTRUCTION (HCC): ICD-10-CM

## 2020-06-29 DIAGNOSIS — N39.46 MIXED STRESS AND URGE URINARY INCONTINENCE: ICD-10-CM

## 2020-06-29 DIAGNOSIS — I10 ESSENTIAL HYPERTENSION: ICD-10-CM

## 2020-06-29 LAB
ALBUMIN SERPL BCP-MCNC: 3.9 G/DL (ref 3.5–5)
ALP SERPL-CCNC: 55 U/L (ref 46–116)
ALT SERPL W P-5'-P-CCNC: 22 U/L (ref 12–78)
ANION GAP SERPL CALCULATED.3IONS-SCNC: 5 MMOL/L (ref 4–13)
AST SERPL W P-5'-P-CCNC: 18 U/L (ref 5–45)
BILIRUB SERPL-MCNC: 0.59 MG/DL (ref 0.2–1)
BUN SERPL-MCNC: 16 MG/DL (ref 5–25)
CALCIUM SERPL-MCNC: 10.1 MG/DL (ref 8.3–10.1)
CHLORIDE SERPL-SCNC: 108 MMOL/L (ref 100–108)
CHOLEST SERPL-MCNC: 208 MG/DL (ref 50–200)
CO2 SERPL-SCNC: 28 MMOL/L (ref 21–32)
CREAT SERPL-MCNC: 1.06 MG/DL (ref 0.6–1.3)
ERYTHROCYTE [DISTWIDTH] IN BLOOD BY AUTOMATED COUNT: 12.8 % (ref 11.6–15.1)
GFR SERPL CREATININE-BSD FRML MDRD: 68 ML/MIN/1.73SQ M
GLUCOSE P FAST SERPL-MCNC: 89 MG/DL (ref 65–99)
HCT VFR BLD AUTO: 50 % (ref 36.5–49.3)
HDLC SERPL-MCNC: 62 MG/DL
HGB BLD-MCNC: 16.6 G/DL (ref 12–17)
LDLC SERPL CALC-MCNC: 113 MG/DL (ref 0–100)
MCH RBC QN AUTO: 31 PG (ref 26.8–34.3)
MCHC RBC AUTO-ENTMCNC: 33.2 G/DL (ref 31.4–37.4)
MCV RBC AUTO: 93 FL (ref 82–98)
NONHDLC SERPL-MCNC: 146 MG/DL
PLATELET # BLD AUTO: 272 THOUSANDS/UL (ref 149–390)
PMV BLD AUTO: 11 FL (ref 8.9–12.7)
POTASSIUM SERPL-SCNC: 4.8 MMOL/L (ref 3.5–5.3)
PROT SERPL-MCNC: 7 G/DL (ref 6.4–8.2)
PSA SERPL-MCNC: <0.1 NG/ML (ref 0–4)
RBC # BLD AUTO: 5.36 MILLION/UL (ref 3.88–5.62)
SODIUM SERPL-SCNC: 141 MMOL/L (ref 136–145)
TRIGL SERPL-MCNC: 164 MG/DL
WBC # BLD AUTO: 6.47 THOUSAND/UL (ref 4.31–10.16)

## 2020-06-29 PROCEDURE — 97140 MANUAL THERAPY 1/> REGIONS: CPT | Performed by: PHYSICAL THERAPIST

## 2020-06-29 PROCEDURE — G0103 PSA SCREENING: HCPCS

## 2020-06-29 PROCEDURE — 36415 COLL VENOUS BLD VENIPUNCTURE: CPT

## 2020-06-29 PROCEDURE — 80053 COMPREHEN METABOLIC PANEL: CPT

## 2020-06-29 PROCEDURE — 85027 COMPLETE CBC AUTOMATED: CPT

## 2020-06-29 PROCEDURE — 80061 LIPID PANEL: CPT

## 2020-06-29 PROCEDURE — 97110 THERAPEUTIC EXERCISES: CPT | Performed by: PHYSICAL THERAPIST

## 2020-07-01 ENCOUNTER — OFFICE VISIT (OUTPATIENT)
Dept: PHYSICAL THERAPY | Facility: REHABILITATION | Age: 75
End: 2020-07-01
Payer: MEDICARE

## 2020-07-01 DIAGNOSIS — S76.211A STRAIN OF ADDUCTOR MAGNUS MUSCLE OF RIGHT LOWER EXTREMITY, INITIAL ENCOUNTER: Primary | ICD-10-CM

## 2020-07-01 PROCEDURE — 97110 THERAPEUTIC EXERCISES: CPT | Performed by: PHYSICAL THERAPIST

## 2020-07-01 PROCEDURE — 97112 NEUROMUSCULAR REEDUCATION: CPT | Performed by: PHYSICAL THERAPIST

## 2020-07-01 PROCEDURE — 97140 MANUAL THERAPY 1/> REGIONS: CPT | Performed by: PHYSICAL THERAPIST

## 2020-07-01 NOTE — PROGRESS NOTES
Daily Note     Today's date: 2020  Patient name: Cedric Starks  : 1945  MRN: [id number]  Referring provider: Nj Gutiérrez MD  Dx:   Encounter Diagnosis     ICD-10-CM    1  Strain of adductor toñito muscle of right lower extremity, initial encounter K87 [id number]        Start Time: 358  Stop Time: 1150  Total time in clinic (min): 65 minutes    Subjective: I am at worst a 5-6/10  I had pain after increased walking yesterday  Objective: See treatment diary below      Assessment: Tolerated treatment well  Patient demonstrated fatigue post treatment, exhibited good technique with therapeutic exercises and would benefit from continued PT      Plan: Continue per plan of care  Progress treatment as tolerated         Precautions: HTN standard       Manuals             STM adductor  LB             TPR adductor  LB            IASTM  LB            laser 14 lancaster  10 min             Neuro Re-Ed             Steamboats  GTB 2x15 all 4 ways             SLS T ball squeeze 10x                                                                              Ther Ex             Bike  upright 10            Adductor stretch  4x20"            Standing dynamic stretch  10x             Hip sliders  2x10            Ball squeeze  15x            Mahendra pro  3x1 min             sideskaters  2x10                          Ther Activity                                       Gait Training                                       Modalities

## 2020-07-06 ENCOUNTER — OFFICE VISIT (OUTPATIENT)
Dept: PHYSICAL THERAPY | Facility: REHABILITATION | Age: 75
End: 2020-07-06
Payer: MEDICARE

## 2020-07-06 DIAGNOSIS — S76.211A STRAIN OF ADDUCTOR MAGNUS MUSCLE OF RIGHT LOWER EXTREMITY, INITIAL ENCOUNTER: Primary | ICD-10-CM

## 2020-07-06 PROCEDURE — 97110 THERAPEUTIC EXERCISES: CPT | Performed by: PHYSICAL THERAPIST

## 2020-07-06 PROCEDURE — 97140 MANUAL THERAPY 1/> REGIONS: CPT | Performed by: PHYSICAL THERAPIST

## 2020-07-06 PROCEDURE — 97112 NEUROMUSCULAR REEDUCATION: CPT | Performed by: PHYSICAL THERAPIST

## 2020-07-06 NOTE — PROGRESS NOTES
Daily Note     Today's date: 2020  Patient name: Belinda Christopher  : 1945  MRN: [id number]  Referring provider: Dilia Shay MD  Dx:   Encounter Diagnosis     ICD-10-CM    1  Strain of adductor toñito muscle of right lower extremity, initial encounter [id number] [id number]        Start Time: 916  Stop Time: 1003  Total time in clinic (min): 47 minutes    Subjective: I am doing much better  Objective: See treatment diary below      Assessment: Tolerated treatment well  Patient demonstrated fatigue post treatment, exhibited good technique with therapeutic exercises and would benefit from continued PT      Plan: Continue per plan of care  Potential discharge next visit       Precautions: HTN standard       Manuals             STM adductor  LB             TPR adductor  LB            IASTM  LB            laser 14 lancaster              Neuro Re-Ed             Steamboats  GTB 2x20 all 4 ways             SLS T ball squeeze 2x20            Bridge with ball squeeze 3x15 10"                                                                Ther Ex             Bike  upright 10            Adductor stretch  4x20"            Standing dynamic stretch  10x             Hip sliders  2x10            Ball squeeze  15x            Mahendra pro  3x1 min             sideskaters  2x10                          Ther Activity                                       Gait Training                                       Modalities

## 2020-07-08 ENCOUNTER — APPOINTMENT (OUTPATIENT)
Dept: PHYSICAL THERAPY | Facility: REHABILITATION | Age: 75
End: 2020-07-08
Payer: MEDICARE

## 2020-07-15 ENCOUNTER — OFFICE VISIT (OUTPATIENT)
Dept: PHYSICAL THERAPY | Facility: REHABILITATION | Age: 75
End: 2020-07-15
Payer: MEDICARE

## 2020-07-15 DIAGNOSIS — N39.46 MIXED STRESS AND URGE URINARY INCONTINENCE: ICD-10-CM

## 2020-07-15 DIAGNOSIS — S76.211A STRAIN OF ADDUCTOR MAGNUS MUSCLE OF RIGHT LOWER EXTREMITY, INITIAL ENCOUNTER: Primary | ICD-10-CM

## 2020-07-15 PROCEDURE — 97112 NEUROMUSCULAR REEDUCATION: CPT | Performed by: PHYSICAL THERAPIST

## 2020-07-15 PROCEDURE — 97140 MANUAL THERAPY 1/> REGIONS: CPT | Performed by: PHYSICAL THERAPIST

## 2020-07-15 PROCEDURE — 97110 THERAPEUTIC EXERCISES: CPT | Performed by: PHYSICAL THERAPIST

## 2020-07-15 NOTE — PROGRESS NOTES
Daily Note     Today's date: 7/15/2020  Patient name: Linnea Turcios  : 1945  MRN: [id number]  Referring provider: Jael Wells MD  Dx:   Encounter Diagnosis     ICD-10-CM    1  Strain of adductor toñito muscle of right lower extremity, initial encounter () [id number]    2  Mixed stress and urge urinary incontinence N39 46        Start Time: 1003  Stop Time: 1048  Total time in clinic (min): 45 minutes    Subjective: I have very little pain 1      Objective: See treatment diary below      Assessment: Tolerated treatment well  Patient demonstrated fatigue post treatment and exhibited good technique with therapeutic exercises      Plan: Potential discharge next visit       Precautions: HTN standard       Manuals 7/15            STM adductor  LB             TPR adductor  LB            IASTM  LB            laser 14 lancaster              Neuro Re-Ed             Steamboats               SLS T ball squeeze 2x20            Bridge with ball squeeze 3x15 10"            T ball wall stab abd 3x15 R/L                                                   Ther Ex             Bike  upright 10            Adductor stretch  4x20"            Standing dynamic stretch  10x             Hip sliders  2x10            Ball squeeze  15x            Mahendra pro  3x1 min             sideskaters                           Ther Activity                                       Gait Training                                       Modalities

## 2020-07-17 ENCOUNTER — OFFICE VISIT (OUTPATIENT)
Dept: PHYSICAL THERAPY | Facility: REHABILITATION | Age: 75
End: 2020-07-17
Payer: MEDICARE

## 2020-07-17 DIAGNOSIS — N39.46 MIXED STRESS AND URGE URINARY INCONTINENCE: ICD-10-CM

## 2020-07-17 DIAGNOSIS — S76.211A STRAIN OF ADDUCTOR MAGNUS MUSCLE OF RIGHT LOWER EXTREMITY, INITIAL ENCOUNTER: Primary | ICD-10-CM

## 2020-07-17 PROCEDURE — 97112 NEUROMUSCULAR REEDUCATION: CPT | Performed by: PHYSICAL THERAPIST

## 2020-07-17 PROCEDURE — 97140 MANUAL THERAPY 1/> REGIONS: CPT | Performed by: PHYSICAL THERAPIST

## 2020-07-17 PROCEDURE — 97110 THERAPEUTIC EXERCISES: CPT | Performed by: PHYSICAL THERAPIST

## 2020-07-17 NOTE — PROGRESS NOTES
PT Discharge    Today's date: 2020  Patient name: Belinda Christopher  : 1945  MRN: [id number]  Referring provider: Dilia Shay MD  Dx:   Encounter Diagnosis     ICD-10-CM    1  Strain of adductor toñito muscle of right lower extremity, initial encounter () 4350 [id number]    2  Mixed stress and urge urinary incontinence N39 46        Start Time: 1000  Stop Time: 1047  Total time in clinic (min): 47 minutes    Assessment  Assessment details: Pt has been attending outpatient PT for __6__  Pt has made steady progress in PT and has met all impairment and functional goals at this time  Pt is independent with HEP  Pt is D/C from PT to HEP continue to progress towards personal goals  Thank you! Goals  Goals  ALL ACHIEVED   Short Term:  Pt will report decreased levels of pain by at least 2 subjective ratings in 4 weeks  Pt will demonstrate improved ROM by at least 10 degrees in 4 weeks  Long Term: ALL ACHIEVED   Pt will be independent in their HEP in 8 weeks  Pt will demonstrate improved FOTO, > 83  Pt will return to lifting objects to perform outdoor household tasks  Pt will tolerate ambulation greater than 1 hour without pain or limitation  Pt will tolerate walking on uneven surfaces without limitation          Plan  Plan details: Dc to HEP        Subjective Evaluation    Pain  Current pain ratin  At best pain ratin  At worst pain ratin          Objective     Neurological Testing     Sensation     Hip   Left Hip   Intact: light touch    Right Hip   Intact: light touch    Reflexes   Left   Patellar (L4): normal (2+)    Right   Patellar (L4): normal (2+)    Active Range of Motion     Right Hip   Flexion: WFL  Extension: WFL  Abduction: WFL    Strength/Myotome Testing     Left Hip   Planes of Motion   Flexion: 5  Extension: 5  Abduction: 5  Adduction: 5  External rotation: 4+  Internal rotation: 4+    Right Hip   Planes of Motion   Flexion: 5  Extension: 5  Abduction: 5  Adduction: 4+  External rotation: 5  Internal rotation: 4+    Tests     Right Hip   Clive: Negative  90/90 SLR: Negative          Precautions: HTN standard       Manuals 7/17            STM adductor  LB             TPR adductor  LB            IASTM  LB            laser 14 lancaster              Neuro Re-Ed             Steamboats               SLS T ball squeeze 2x20            Bridge with ball squeeze 3x15 10"            T ball wall stab abd 3x15 R/L                                                   Ther Ex             Bike  upright 10            Adductor stretch  4x20"            Standing dynamic stretch  10x             Hip sliders              Ball squeeze  15x w bridge            Mahendra pro  3x1 min             sideskaters                           Ther Activity                                       Gait Training                                       Modalities

## 2020-08-25 ENCOUNTER — TELEPHONE (OUTPATIENT)
Dept: UROLOGY | Facility: MEDICAL CENTER | Age: 75
End: 2020-08-25

## 2020-08-25 NOTE — TELEPHONE ENCOUNTER
This is a patient of Dr Bre Levy and seen by Ashlee in [place]  Patient called and said he forgot about his appointment today  I rescheduled the patient on 10/6/20 with Ashlee  He said he also had his PSA done already  This is FYI

## 2020-09-30 ENCOUNTER — APPOINTMENT (OUTPATIENT)
Dept: LAB | Age: 75
End: 2020-09-30
Payer: MEDICARE

## 2020-09-30 DIAGNOSIS — C61 PROSTATE CANCER (HCC): ICD-10-CM

## 2020-09-30 LAB — PSA SERPL-MCNC: <0.1 NG/ML (ref 0–4)

## 2020-09-30 PROCEDURE — 84153 ASSAY OF PSA TOTAL: CPT

## 2020-10-01 ENCOUNTER — OFFICE VISIT (OUTPATIENT)
Dept: DERMATOLOGY | Facility: CLINIC | Age: 75
End: 2020-10-01
Payer: MEDICARE

## 2020-10-01 ENCOUNTER — TELEPHONE (OUTPATIENT)
Dept: DERMATOLOGY | Facility: CLINIC | Age: 75
End: 2020-10-01

## 2020-10-01 VITALS — WEIGHT: 198 LBS | TEMPERATURE: 98.7 F | HEIGHT: 72 IN | BODY MASS INDEX: 26.82 KG/M2

## 2020-10-01 DIAGNOSIS — C44.519 BASAL CELL CARCINOMA (BCC) OF CHEST: Primary | ICD-10-CM

## 2020-10-01 PROCEDURE — 99203 OFFICE O/P NEW LOW 30 MIN: CPT | Performed by: DERMATOLOGY

## 2020-10-05 ENCOUNTER — OFFICE VISIT (OUTPATIENT)
Dept: UROLOGY | Facility: AMBULATORY SURGERY CENTER | Age: 75
End: 2020-10-05
Payer: MEDICARE

## 2020-10-05 VITALS
SYSTOLIC BLOOD PRESSURE: 144 MMHG | HEIGHT: 72 IN | BODY MASS INDEX: 26.41 KG/M2 | TEMPERATURE: 97.1 F | WEIGHT: 195 LBS | DIASTOLIC BLOOD PRESSURE: 92 MMHG | HEART RATE: 67 BPM

## 2020-10-05 DIAGNOSIS — C61 PROSTATE CANCER (HCC): Primary | ICD-10-CM

## 2020-10-05 PROCEDURE — 99214 OFFICE O/P EST MOD 30 MIN: CPT | Performed by: UROLOGY

## 2020-10-05 RX ORDER — FAMOTIDINE 20 MG/1
20 TABLET, FILM COATED ORAL 2 TIMES DAILY
COMMUNITY
Start: 2020-09-20

## 2020-10-23 ENCOUNTER — TELEPHONE (OUTPATIENT)
Dept: DERMATOLOGY | Facility: CLINIC | Age: 75
End: 2020-10-23

## 2020-10-26 ENCOUNTER — TRANSCRIBE ORDERS (OUTPATIENT)
Dept: ADMINISTRATIVE | Age: 75
End: 2020-10-26

## 2020-10-26 ENCOUNTER — LAB (OUTPATIENT)
Dept: LAB | Age: 75
End: 2020-10-26
Payer: MEDICARE

## 2020-10-26 ENCOUNTER — APPOINTMENT (OUTPATIENT)
Dept: LAB | Age: 75
End: 2020-10-26
Payer: MEDICARE
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
## 2020-10-26 PROCEDURE — 86618 LYME DISEASE ANTIBODY: CPT

## 2020-10-26 PROCEDURE — 36415 COLL VENOUS BLD VENIPUNCTURE: CPT

## 2020-10-27 LAB — B BURGDOR IGG+IGM SER-ACNC: 17

## 2020-11-09 ENCOUNTER — PROCEDURE VISIT (OUTPATIENT)
Dept: DERMATOLOGY | Facility: CLINIC | Age: 75
End: 2020-11-09
Payer: MEDICARE

## 2020-11-09 VITALS
DIASTOLIC BLOOD PRESSURE: 78 MMHG | BODY MASS INDEX: 26.95 KG/M2 | SYSTOLIC BLOOD PRESSURE: 126 MMHG | WEIGHT: 199 LBS | HEART RATE: 90 BPM | HEIGHT: 72 IN | TEMPERATURE: 98.7 F

## 2020-11-09 DIAGNOSIS — C44.519 BASAL CELL CARCINOMA (BCC) OF SKIN OF TRUNK: Primary | ICD-10-CM

## 2020-11-09 PROCEDURE — 17314 MOHS ADDL STAGE T/A/L: CPT | Performed by: DERMATOLOGY

## 2020-11-09 PROCEDURE — 17313 MOHS 1 STAGE T/A/L: CPT | Performed by: DERMATOLOGY

## 2020-11-09 PROCEDURE — 12032 INTMD RPR S/A/T/EXT 2.6-7.5: CPT | Performed by: DERMATOLOGY

## 2020-11-23 ENCOUNTER — OFFICE VISIT (OUTPATIENT)
Dept: DERMATOLOGY | Facility: CLINIC | Age: 75
End: 2020-11-23

## 2020-11-23 DIAGNOSIS — Z48.02 ENCOUNTER FOR REMOVAL OF SUTURES: Primary | ICD-10-CM

## 2020-11-23 PROCEDURE — RECHECK: Performed by: DERMATOLOGY

## 2020-12-21 ENCOUNTER — PREP FOR PROCEDURE (OUTPATIENT)
Dept: SURGERY | Facility: CLINIC | Age: 75
End: 2020-12-21

## 2020-12-21 ENCOUNTER — CONSULT (OUTPATIENT)
Dept: SURGERY | Facility: CLINIC | Age: 75
End: 2020-12-21
Payer: MEDICARE

## 2020-12-21 VITALS — TEMPERATURE: 95.6 F | HEIGHT: 72 IN | WEIGHT: 190 LBS | BODY MASS INDEX: 25.73 KG/M2

## 2020-12-21 DIAGNOSIS — K40.90 INGUINAL HERNIA: Primary | ICD-10-CM

## 2020-12-21 PROCEDURE — 99203 OFFICE O/P NEW LOW 30 MIN: CPT | Performed by: SURGERY

## 2020-12-21 NOTE — H&P (VIEW-ONLY)
Assessment/Plan:  Patient presents with a right inguinal hernia  He desires undergo repair  He has a bulge in this region associated with a sharp pain intermittently over the last 9 months  It does limit his activities  He describes his pain is mild to moderate  Activity worsens the pain, rest improves the discomfort  Right inguinal hernias noted on exam   Operative repair is recommended  Risks and benefits explained in detail  I recommended an open repair as it is unilateral and he is status post prostatectomy (preperitoneal adhesions)    Diagnoses and all orders for this visit:    Inguinal hernia        Subjective:      Patient ID: Leidy Salazar is a 67 y o  male  Patient presents for right inguinal hernia consult  States he has had a bulge and sharp pain RLQ for 9 months  Limits his activities  The following portions of the patient's history were reviewed and updated as appropriate:     He  has a past medical history of Back problem, Basal cell carcinoma, Basal cell carcinoma (10/14/2020), Cancer ([facility] [address] ), Hypertension, and Stomach ulcer  He  has a past surgical history that includes Prostatectomy (2004); Neck surgery (05/2006); Tonsillectomy; Ear reconstruction (Right); pr endoscopic injection/implant (N/A, 2/11/2016); CYSTOSCOPY (N/A, 2/11/2016); and Mohs surgery (11/09/2020)  His family history includes Basal cell carcinoma in his sister; Hypertension in his father; Stroke in his father  He  reports that he has never smoked  He has never used smokeless tobacco  He reports current alcohol use of about 7 0 standard drinks of alcohol per week  He reports that he does not use drugs  Current Outpatient Medications   Medication Sig Dispense Refill    Aspirin-Acetaminophen-Caffeine (EXCEDRIN PO) Take by mouth        Calcium Carbonate-Vitamin D3 600-400 MG-UNIT TABS Take by mouth      famotidine (PEPCID) 20 mg tablet TK 1 T PO BID      glucosamine sulfate 500 mg Take 500 mg by mouth Three times a day      Incontinence Supplies ([facility] INCONTIN CLAMP) MISC by Does not apply route daily 1 each 6    losartan (COZAAR) 50 mg tablet Take 50 mg by mouth daily   LUTEIN PO Take 1 tablet by mouth daily      Multiple Vitamin (MULTIVITAMIN) tablet Take 1 tablet by mouth daily   nebivolol (BYSTOLIC) 5 mg tablet Take 5 mg by mouth daily      Omega-3 Fatty Acids (FISH OIL) 1200 MG CPDR Take by mouth   omeprazole (PriLOSEC) 20 mg delayed release capsule Take 20 mg by mouth daily   omeprazole (PriLOSEC) 20 mg delayed release capsule TK 1 C PO QD  1    pravastatin (PRAVACHOL) 20 mg tablet TK 1 T PO QD  3    Resveratrol 250 MG CAPS Take by mouth   traMADol (ULTRAM) 50 mg tablet TK 1 T PO Q 6 H PRF PAIN  3     No current facility-administered medications for this visit  He has No Known Allergies       Review of Systems   Gastrointestinal: Positive for abdominal pain  Musculoskeletal: Positive for back pain  Objective:      Temp (!) 95 6 °F (35 3 °C)   Ht 6' (1 829 m)   Wt 86 2 kg (190 lb)   BMI 25 77 kg/m²          Physical Exam  Constitutional:       Appearance: He is well-developed  He is not diaphoretic  HENT:      Head: Normocephalic and atraumatic  Right Ear: External ear normal       Left Ear: External ear normal    Eyes:      General: No scleral icterus  Right eye: No discharge  Left eye: No discharge  Extraocular Movements: Extraocular movements intact  Conjunctiva/sclera: Conjunctivae normal    Neck:      Musculoskeletal: Normal range of motion and neck supple  Thyroid: No thyromegaly  Trachea: No tracheal deviation  Cardiovascular:      Rate and Rhythm: Normal rate and regular rhythm  Heart sounds: Normal heart sounds  No murmur  No friction rub  Pulmonary:      Effort: Pulmonary effort is normal  No respiratory distress  Breath sounds: Normal breath sounds  No stridor  No wheezing     Chest: Chest wall: No tenderness  Abdominal:      General: There is no distension  Palpations: Abdomen is soft  There is no mass  Tenderness: There is no abdominal tenderness  There is no guarding or rebound  Right CVA tenderness: s reducible  No evidence for left inguinal hernia  Hernia: A hernia is present  Musculoskeletal: Normal range of motion  General: No tenderness  Lymphadenopathy:      Cervical: No cervical adenopathy  Skin:     General: Skin is warm and dry  Findings: No erythema or rash  Neurological:      Mental Status: He is alert and oriented to person, place, and time  Cranial Nerves: No cranial nerve deficit        Coordination: Coordination normal    Psychiatric:         Behavior: Behavior normal          Judgment: Judgment normal

## 2020-12-29 ENCOUNTER — OFFICE VISIT (OUTPATIENT)
Dept: LAB | Age: 75
End: 2020-12-29
Payer: MEDICARE

## 2020-12-29 ENCOUNTER — LAB (OUTPATIENT)
Dept: LAB | Age: 75
End: 2020-12-29
Payer: MEDICARE

## 2020-12-29 ENCOUNTER — ANESTHESIA EVENT (OUTPATIENT)
Dept: PERIOP | Facility: AMBULARY SURGERY CENTER | Age: 75
End: 2020-12-29
Payer: MEDICARE

## 2020-12-29 DIAGNOSIS — K40.90 INGUINAL HERNIA: ICD-10-CM

## 2020-12-29 LAB
ALBUMIN SERPL BCP-MCNC: 4.1 G/DL (ref 3.5–5)
ALP SERPL-CCNC: 59 U/L (ref 46–116)
ALT SERPL W P-5'-P-CCNC: 24 U/L (ref 12–78)
ANION GAP SERPL CALCULATED.3IONS-SCNC: 3 MMOL/L (ref 4–13)
AST SERPL W P-5'-P-CCNC: 17 U/L (ref 5–45)
BILIRUB SERPL-MCNC: 0.45 MG/DL (ref 0.2–1)
BUN SERPL-MCNC: 16 MG/DL (ref 5–25)
CALCIUM SERPL-MCNC: 10.2 MG/DL (ref 8.3–10.1)
CHLORIDE SERPL-SCNC: 107 MMOL/L (ref 100–108)
CO2 SERPL-SCNC: 31 MMOL/L (ref 21–32)
CREAT SERPL-MCNC: 0.92 MG/DL (ref 0.6–1.3)
ERYTHROCYTE [DISTWIDTH] IN BLOOD BY AUTOMATED COUNT: 12.5 % (ref 11.6–15.1)
GFR SERPL CREATININE-BSD FRML MDRD: 81 ML/MIN/1.73SQ M
GLUCOSE SERPL-MCNC: 73 MG/DL (ref 65–140)
HCT VFR BLD AUTO: 49.1 % (ref 36.5–49.3)
HGB BLD-MCNC: 16.1 G/DL (ref 12–17)
MCH RBC QN AUTO: 30.8 PG (ref 26.8–34.3)
MCHC RBC AUTO-ENTMCNC: 32.8 G/DL (ref 31.4–37.4)
MCV RBC AUTO: 94 FL (ref 82–98)
PLATELET # BLD AUTO: 287 THOUSANDS/UL (ref 149–390)
PMV BLD AUTO: 10.8 FL (ref 8.9–12.7)
POTASSIUM SERPL-SCNC: 4.8 MMOL/L (ref 3.5–5.3)
PROT SERPL-MCNC: 6.8 G/DL (ref 6.4–8.2)
RBC # BLD AUTO: 5.22 MILLION/UL (ref 3.88–5.62)
SODIUM SERPL-SCNC: 141 MMOL/L (ref 136–145)
WBC # BLD AUTO: 7.14 THOUSAND/UL (ref 4.31–10.16)

## 2020-12-29 PROCEDURE — 36415 COLL VENOUS BLD VENIPUNCTURE: CPT

## 2020-12-29 PROCEDURE — 85027 COMPLETE CBC AUTOMATED: CPT

## 2020-12-29 PROCEDURE — 80053 COMPREHEN METABOLIC PANEL: CPT

## 2020-12-29 PROCEDURE — 93005 ELECTROCARDIOGRAM TRACING: CPT

## 2020-12-30 LAB
ATRIAL RATE: 53 BPM
P AXIS: 14 DEGREES
PR INTERVAL: 158 MS
QRS AXIS: -1 DEGREES
QRSD INTERVAL: 110 MS
QT INTERVAL: 402 MS
QTC INTERVAL: 377 MS
T WAVE AXIS: 38 DEGREES
VENTRICULAR RATE: 53 BPM

## 2020-12-30 PROCEDURE — 93010 ELECTROCARDIOGRAM REPORT: CPT | Performed by: INTERNAL MEDICINE

## 2021-01-04 NOTE — PRE-PROCEDURE INSTRUCTIONS
Pre-Surgery Instructions:   Medication Instructions    Aspirin-Acetaminophen-Caffeine (EXCEDRIN PO) Instructed patient per Anesthesia Guidelines   Cholecalciferol (VITAMIN D3 PO) Instructed patient per Anesthesia Guidelines   famotidine (PEPCID) 20 mg tablet Instructed to take per normal schedule including DOS   losartan (COZAAR) 50 mg tablet Instructed to take per normal schedule    Multiple Vitamin (MULTIVITAMIN) tablet Instructed patient per Anesthesia Guidelines   Omega-3 Fatty Acids (FISH OIL) 1200 MG CPDR Instructed patient per Anesthesia Guidelines   pravastatin (PRAVACHOL) 20 mg tablet Instructed to take per normal schedule    Resveratrol 250 MG CAPS Instructed patient per Anesthesia Guidelines   traMADol (ULTRAM) 50 mg tablet Instructed to take as needed including DOS    TURMERIC PO Instructed patient per Anesthesia Guidelines  Pre op instructions per Dellie Windy protocol,medications per anesthesia guidelines and showering instructions per Dellie Windy protocol reviewed-Patient has hibiclens  Pt  Verbalized understanding of current visitor restrictions  Instructed to avoid all ASA and OTC Vit/Supp 1 week prior to surgery and to avoid NSAIDs 3 days prior to surgery  Tylenol OK to take   Pt  Verbalized an understanding of all instructions reviewed and offers no concerns at this time

## 2021-01-06 NOTE — DISCHARGE INSTRUCTIONS
Please call the office when you leave to schedule an appointment for 2 weeks  This can be a virtual / telephone consultation or it can be in person  The choice is yours  Please call [phone]   [address], [address], [facility], [address]  [address] between [facility] and [facility] [place]  Activity:    May lift 10 lb as many times as desired the 1st week,       20 lb in 2 weeks,       30 lb in 3 weeks  Walking is encouraged  Normal daily activities including climbing steps are okay  Do not engage in strenuous activity ( sit-ups or crutches) or contact sports for 4-6 weeks post-operatively    Return to Work:   Okay to return to work when you feel well if you desire  Diet:   You may return to your normal healthy diet  Wound Care: Your wound is closed with dissolvable stitches and glue  It is okay to shower  Wash incision gently with soap and water and pat dry  Do not soak incisions in bath water or swim for two weeks  Do not apply any creams or ointments  Pain Medication:   Please take as directed if needed  May use Advil or Motrin in addition  Recall, the pain medicine and anesthesia is associated with constipation  No driving while taking narcotic pain medications  Other: It is normal to developed a healing ridge / firm incision after surgery  This is your body making scar tissue  It is a good sign  Constipation is very common after general anesthesia  Please use milk of magnesia as needed in order to help prevent constipation  It is normal to get bruising after surgery  If you have questions after discharge please call the office      If you have increased pain, fever >101 5, increased drainage, redness or a bad smell at your surgery site, please call us immediately or come directly to the Emergency Room

## 2021-01-08 ENCOUNTER — HOSPITAL ENCOUNTER (OUTPATIENT)
Facility: AMBULARY SURGERY CENTER | Age: 76
Setting detail: OUTPATIENT SURGERY
Discharge: HOME | End: 2021-01-08
Attending: SURGERY | Admitting: SURGERY
Payer: MEDICARE

## 2021-01-08 ENCOUNTER — ANESTHESIA (OUTPATIENT)
Dept: PERIOP | Facility: AMBULARY SURGERY CENTER | Age: 76
End: 2021-01-08
Payer: MEDICARE

## 2021-01-08 VITALS
BODY MASS INDEX: 25.73 KG/M2 | WEIGHT: 190 LBS | HEART RATE: 82 BPM | OXYGEN SATURATION: 94 % | DIASTOLIC BLOOD PRESSURE: 67 MMHG | RESPIRATION RATE: 16 BRPM | SYSTOLIC BLOOD PRESSURE: 127 MMHG | TEMPERATURE: 97.7 F | HEIGHT: 72 IN

## 2021-01-08 VITALS — HEART RATE: 85 BPM

## 2021-01-08 DIAGNOSIS — K40.90 INGUINAL HERNIA: Primary | ICD-10-CM

## 2021-01-08 PROCEDURE — G9197 ORDER FOR CEPH: HCPCS | Performed by: SURGERY

## 2021-01-08 PROCEDURE — C1781 MESH (IMPLANTABLE): HCPCS | Performed by: SURGERY

## 2021-01-08 PROCEDURE — 49505 PRP I/HERN INIT REDUC >5 YR: CPT | Performed by: SURGERY

## 2021-01-08 DEVICE — MODIFIED ONFLEX MESH
Type: IMPLANTABLE DEVICE | Site: INGUINAL | Status: FUNCTIONAL
Brand: MODIFIED ONFLEX MESH

## 2021-01-08 RX ORDER — HYDROMORPHONE HCL/PF 1 MG/ML
0.5 SYRINGE (ML) INJECTION
Status: DISCONTINUED | OUTPATIENT
Start: 2021-01-08 | End: 2021-01-08 | Stop reason: HOSPADM

## 2021-01-08 RX ORDER — FENTANYL CITRATE/PF 50 MCG/ML
25 SYRINGE (ML) INJECTION
Status: DISCONTINUED | OUTPATIENT
Start: 2021-01-08 | End: 2021-01-08 | Stop reason: HOSPADM

## 2021-01-08 RX ORDER — PROPOFOL 10 MG/ML
INJECTION, EMULSION INTRAVENOUS AS NEEDED
Status: DISCONTINUED | OUTPATIENT
Start: 2021-01-08 | End: 2021-01-08

## 2021-01-08 RX ORDER — ACETAMINOPHEN 325 MG/1
650 TABLET ORAL EVERY 4 HOURS PRN
Status: DISCONTINUED | OUTPATIENT
Start: 2021-01-08 | End: 2021-01-08 | Stop reason: HOSPADM

## 2021-01-08 RX ORDER — ONDANSETRON 2 MG/ML
4 INJECTION INTRAMUSCULAR; INTRAVENOUS EVERY 4 HOURS PRN
Status: DISCONTINUED | OUTPATIENT
Start: 2021-01-08 | End: 2021-01-08 | Stop reason: HOSPADM

## 2021-01-08 RX ORDER — SODIUM CHLORIDE, SODIUM LACTATE, POTASSIUM CHLORIDE, CALCIUM CHLORIDE 600; 310; 30; 20 MG/100ML; MG/100ML; MG/100ML; MG/100ML
50 INJECTION, SOLUTION INTRAVENOUS CONTINUOUS
Status: CANCELLED | OUTPATIENT
Start: 2021-01-08

## 2021-01-08 RX ORDER — OXYCODONE HYDROCHLORIDE AND ACETAMINOPHEN 5; 325 MG/1; MG/1
1 TABLET ORAL EVERY 4 HOURS PRN
Qty: 12 TABLET | Refills: 0 | Status: SHIPPED | OUTPATIENT
Start: 2021-01-08

## 2021-01-08 RX ORDER — DEXAMETHASONE SODIUM PHOSPHATE 4 MG/ML
INJECTION, SOLUTION INTRA-ARTICULAR; INTRALESIONAL; INTRAMUSCULAR; INTRAVENOUS; SOFT TISSUE AS NEEDED
Status: DISCONTINUED | OUTPATIENT
Start: 2021-01-08 | End: 2021-01-08

## 2021-01-08 RX ORDER — FENTANYL CITRATE 50 UG/ML
INJECTION, SOLUTION INTRAMUSCULAR; INTRAVENOUS AS NEEDED
Status: DISCONTINUED | OUTPATIENT
Start: 2021-01-08 | End: 2021-01-08

## 2021-01-08 RX ORDER — LIDOCAINE HYDROCHLORIDE 10 MG/ML
0.5 INJECTION, SOLUTION EPIDURAL; INFILTRATION; INTRACAUDAL; PERINEURAL ONCE AS NEEDED
Status: DISCONTINUED | OUTPATIENT
Start: 2021-01-08 | End: 2021-01-08 | Stop reason: HOSPADM

## 2021-01-08 RX ORDER — EPHEDRINE SULFATE 50 MG/ML
INJECTION INTRAVENOUS AS NEEDED
Status: DISCONTINUED | OUTPATIENT
Start: 2021-01-08 | End: 2021-01-08

## 2021-01-08 RX ORDER — ONDANSETRON 2 MG/ML
4 INJECTION INTRAMUSCULAR; INTRAVENOUS ONCE AS NEEDED
Status: DISCONTINUED | OUTPATIENT
Start: 2021-01-08 | End: 2021-01-08 | Stop reason: HOSPADM

## 2021-01-08 RX ORDER — SODIUM CHLORIDE, SODIUM LACTATE, POTASSIUM CHLORIDE, CALCIUM CHLORIDE 600; 310; 30; 20 MG/100ML; MG/100ML; MG/100ML; MG/100ML
INJECTION, SOLUTION INTRAVENOUS CONTINUOUS PRN
Status: DISCONTINUED | OUTPATIENT
Start: 2021-01-08 | End: 2021-01-08

## 2021-01-08 RX ORDER — OXYCODONE HYDROCHLORIDE AND ACETAMINOPHEN 5; 325 MG/1; MG/1
1 TABLET ORAL EVERY 4 HOURS PRN
Status: DISCONTINUED | OUTPATIENT
Start: 2021-01-08 | End: 2021-01-08 | Stop reason: HOSPADM

## 2021-01-08 RX ORDER — ONDANSETRON 2 MG/ML
INJECTION INTRAMUSCULAR; INTRAVENOUS AS NEEDED
Status: DISCONTINUED | OUTPATIENT
Start: 2021-01-08 | End: 2021-01-08

## 2021-01-08 RX ORDER — SODIUM CHLORIDE, SODIUM LACTATE, POTASSIUM CHLORIDE, CALCIUM CHLORIDE 600; 310; 30; 20 MG/100ML; MG/100ML; MG/100ML; MG/100ML
125 INJECTION, SOLUTION INTRAVENOUS CONTINUOUS
Status: DISCONTINUED | OUTPATIENT
Start: 2021-01-08 | End: 2021-01-08 | Stop reason: HOSPADM

## 2021-01-08 RX ORDER — CEFAZOLIN SODIUM 2 G/50ML
SOLUTION INTRAVENOUS AS NEEDED
Status: DISCONTINUED | OUTPATIENT
Start: 2021-01-08 | End: 2021-01-08

## 2021-01-08 RX ORDER — CEFAZOLIN SODIUM 2 G/50ML
2000 SOLUTION INTRAVENOUS ONCE
Status: DISCONTINUED | OUTPATIENT
Start: 2021-01-08 | End: 2021-01-08 | Stop reason: HOSPADM

## 2021-01-08 RX ORDER — MAGNESIUM HYDROXIDE 1200 MG/15ML
LIQUID ORAL AS NEEDED
Status: DISCONTINUED | OUTPATIENT
Start: 2021-01-08 | End: 2021-01-08 | Stop reason: HOSPADM

## 2021-01-08 RX ORDER — BUPIVACAINE HYDROCHLORIDE 2.5 MG/ML
INJECTION, SOLUTION EPIDURAL; INFILTRATION; INTRACAUDAL AS NEEDED
Status: DISCONTINUED | OUTPATIENT
Start: 2021-01-08 | End: 2021-01-08 | Stop reason: HOSPADM

## 2021-01-08 RX ORDER — KETOROLAC TROMETHAMINE 30 MG/ML
INJECTION, SOLUTION INTRAMUSCULAR; INTRAVENOUS AS NEEDED
Status: DISCONTINUED | OUTPATIENT
Start: 2021-01-08 | End: 2021-01-08

## 2021-01-08 RX ADMIN — ONDANSETRON 4 MG: 2 INJECTION INTRAMUSCULAR; INTRAVENOUS at 08:56

## 2021-01-08 RX ADMIN — PROPOFOL 150 MG: 10 INJECTION, EMULSION INTRAVENOUS at 08:51

## 2021-01-08 RX ADMIN — CEFAZOLIN SODIUM 2000 MG: 2 SOLUTION INTRAVENOUS at 08:37

## 2021-01-08 RX ADMIN — EPHEDRINE SULFATE 5 MG: 50 INJECTION, SOLUTION INTRAVENOUS at 09:49

## 2021-01-08 RX ADMIN — EPHEDRINE SULFATE 5 MG: 50 INJECTION, SOLUTION INTRAVENOUS at 09:02

## 2021-01-08 RX ADMIN — FENTANYL CITRATE 25 MCG: 50 INJECTION, SOLUTION INTRAMUSCULAR; INTRAVENOUS at 09:14

## 2021-01-08 RX ADMIN — FENTANYL CITRATE 25 MCG: 50 INJECTION, SOLUTION INTRAMUSCULAR; INTRAVENOUS at 09:32

## 2021-01-08 RX ADMIN — EPHEDRINE SULFATE 5 MG: 50 INJECTION, SOLUTION INTRAVENOUS at 09:40

## 2021-01-08 RX ADMIN — EPHEDRINE SULFATE 5 MG: 50 INJECTION, SOLUTION INTRAVENOUS at 09:05

## 2021-01-08 RX ADMIN — EPHEDRINE SULFATE 5 MG: 50 INJECTION, SOLUTION INTRAVENOUS at 09:25

## 2021-01-08 RX ADMIN — FENTANYL CITRATE 25 MCG: 50 INJECTION, SOLUTION INTRAMUSCULAR; INTRAVENOUS at 08:51

## 2021-01-08 RX ADMIN — SODIUM CHLORIDE, SODIUM LACTATE, POTASSIUM CHLORIDE, AND CALCIUM CHLORIDE: .6; .31; .03; .02 INJECTION, SOLUTION INTRAVENOUS at 08:47

## 2021-01-08 RX ADMIN — DEXAMETHASONE SODIUM PHOSPHATE 4 MG: 4 INJECTION INTRA-ARTICULAR; INTRALESIONAL; INTRAMUSCULAR; INTRAVENOUS; SOFT TISSUE at 08:56

## 2021-01-08 RX ADMIN — EPHEDRINE SULFATE 5 MG: 50 INJECTION, SOLUTION INTRAVENOUS at 09:13

## 2021-01-08 RX ADMIN — KETOROLAC TROMETHAMINE 15 MG: 30 INJECTION, SOLUTION INTRAMUSCULAR at 09:12

## 2021-01-08 NOTE — OP NOTE
OPERATIVE REPORT  PATIENT NAME: Mac Webster    :  1945  MRN: [id number]  Pt Location: [facility] OR ROOM 01    SURGERY DATE: 2021    Surgeon(s) and Role:     Yanni Wasserman MD - Primary    Preop Diagnosis:  Inguinal hernia [K40 90]    Post-Op Diagnosis Codes:     * Inguinal hernia [K40 90]    Procedure(s) (LRB):  INGUINAL HERNIA REPAIR WITH MESH (Right)    Specimen(s):  * No specimens in log *    Estimated Blood Loss:   Minimal    Drains:  * No LDAs found *    Anesthesia Type:   General    Operative Indications:  Inguinal hernia [K40 90]      Operative Findings:  Independent, nonsmoker, ASA 2, wound class 1, BMI 26, weight 190, height 72  CARDIO   (+) Hypertension       GI/HEPATIC   (+) PUD (peptic ulcer disease)       /RENAL   (+) Prostate cancer ([facility] [address] )       Other   (+) Inguinal hernia                     Complications:   None    Procedure and Technique:  Mac Webster is a 67 y o  male was brought into the operative suite and identified visually and by arm band  The patient was placed in the supine position  Careful attention was made towards padding and positioning of the extremities  After sterile prep and drape, a timeout was completed  The prep was dry  Antibiotics were provided  Athrombic pumps in place  0 25% Marcaine with epinephrine was utilized throughout the procedure  An incision was made  within the right inguinal region  The incision was carried down through the skin and subcutaneous tissue  The superficial epigastric vein was clamped, ligated and  divided    The external oblique fibers were identified  The external ring was identified  The external oblique fibers were then split in the direction of their fibers  Hemostats were placed on the cut edges  A self-retaining retractor was then placed  Exploration of the inguinal canal commenced    The cremasteric fibers were then divided along the fiber direction of its fibers the cord structures were then encircled in a atraumatic Penrose drain and preserved during dissection  Identification of a indirect and direct inguinal hernia was performed  High dissection was completed at the level of the internal ring  Preperitoneal dissection commenced identifying and preserving the inferior epigastric vessels    A preperitoneal mesh was then inserted  The branch of the genitofemoral nerve was divided in order to help prevent postoperative neuralgia  The inguinal floor was then repaired with interrupted figure-of-eight 0 Vicryl suture  The indirect inguinal ring was repositioned more superiorly while repairing the inguinal transversalis muscular floor  An onlay mesh was then secured to the tendon overlying the lateral pubic tubercle The external oblique fascia was closed with a running 3-0 PDS suture  Additional 0 25% Marcaine with epinephrine was injected over the ilioinguinal and iliohypogastric nerves  3-0 Vicryl subcutaneous suture was placed into the Tereza's fascia   4-0 Monocryl suture was used for the subcuticular layer  Dermabond was then applied  The patient was then awakened from general anesthesia and transferred to the recovery room in stable condition  Sponge and instrument count correct ×2    Right   I was present for the entire procedure    Patient Disposition:  PACU     SIGNATURE: Reed Dunbar MD  DATE: January 8, 2021  TIME: 9:57 AM

## 2021-01-08 NOTE — ANESTHESIA POSTPROCEDURE EVALUATION
Post-Op Assessment Note    CV Status:  Stable  Pain Score: 0    Pain management: adequate     Mental Status:  Alert and awake   Hydration Status:  Euvolemic   PONV Controlled:  Controlled   Airway Patency:  Patent      Post Op Vitals Reviewed: Yes      Staff: Anesthesiologist, CRNA         No complications documented      BP   142/76   Temp  97 4   Pulse  93   Resp   14   SpO2   97

## 2021-01-08 NOTE — INTERVAL H&P NOTE
H&P reviewed  After examining the patient I find no changes in the patients condition since the H&P had been written      Vitals:    01/08/21 0752   BP: 149/80   Pulse: 92   Resp: 16   Temp: (!) 97 3 °F (36 3 °C)   SpO2: 94%
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
## 2021-01-25 ENCOUNTER — OFFICE VISIT (OUTPATIENT)
Dept: SURGERY | Facility: CLINIC | Age: 76
End: 2021-01-25

## 2021-01-25 DIAGNOSIS — K40.90 INGUINAL HERNIA: Primary | ICD-10-CM

## 2021-01-25 PROCEDURE — 99024 POSTOP FOLLOW-UP VISIT: CPT | Performed by: SURGERY
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
## 2021-02-12 DIAGNOSIS — Z23 ENCOUNTER FOR IMMUNIZATION: ICD-10-CM

## 2021-02-15 ENCOUNTER — TELEPHONE (OUTPATIENT)
Dept: UROLOGY | Facility: AMBULATORY SURGERY CENTER | Age: 76
End: 2021-02-15

## 2021-02-17 ENCOUNTER — OFFICE VISIT (OUTPATIENT)
Dept: UROLOGY | Facility: AMBULATORY SURGERY CENTER | Age: 76
End: 2021-02-17
Payer: MEDICARE

## 2021-02-17 ENCOUNTER — TELEPHONE (OUTPATIENT)
Dept: UROLOGY | Facility: AMBULATORY SURGERY CENTER | Age: 76
End: 2021-02-17

## 2021-02-17 VITALS
HEART RATE: 80 BPM | DIASTOLIC BLOOD PRESSURE: 82 MMHG | HEIGHT: 72 IN | WEIGHT: 202 LBS | SYSTOLIC BLOOD PRESSURE: 122 MMHG | BODY MASS INDEX: 27.36 KG/M2

## 2021-02-17 DIAGNOSIS — C61 PROSTATE CANCER (HCC): Primary | ICD-10-CM

## 2021-02-17 DIAGNOSIS — N39.3 STRESS INCONTINENCE OF URINE: ICD-10-CM

## 2021-02-17 PROCEDURE — 99214 OFFICE O/P EST MOD 30 MIN: CPT | Performed by: NURSE PRACTITIONER

## 2021-02-17 RX ORDER — SOLIFENACIN SUCCINATE 5 MG/1
5 TABLET, FILM COATED ORAL DAILY
Qty: 30 TABLET | Refills: 5 | Status: SHIPPED | OUTPATIENT
Start: 2021-02-17
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
## 2021-02-17 NOTE — PATIENT INSTRUCTIONS
Repeat PSA in 9/2021  Vesicare as directed  Continue with pelvic floor exercises  Maintain hydration upwards to 40-60 oz of water intake per day  Avoid/limit the intake of coffee/soda/tea   Call the office for concerns or questions

## 2021-02-26 ENCOUNTER — APPOINTMENT (OUTPATIENT)
Dept: RADIOLOGY | Age: 76
End: 2021-02-26
Payer: MEDICARE

## 2021-02-26 ENCOUNTER — TRANSCRIBE ORDERS (OUTPATIENT)
Dept: ADMINISTRATIVE | Age: 76
End: 2021-02-26

## 2021-02-26 DIAGNOSIS — M25.552 LEFT HIP PAIN: ICD-10-CM

## 2021-02-26 DIAGNOSIS — M25.552 LEFT HIP PAIN: Primary | ICD-10-CM

## 2021-02-26 DIAGNOSIS — C61 PROSTATE CA (HCC): ICD-10-CM

## 2021-02-26 PROCEDURE — 73502 X-RAY EXAM HIP UNI 2-3 VIEWS: CPT

## 2021-04-13 ENCOUNTER — OFFICE VISIT (OUTPATIENT)
Dept: SURGERY | Facility: CLINIC | Age: 76
End: 2021-04-13

## 2021-04-13 DIAGNOSIS — M54.30 SCIATICA: Primary | ICD-10-CM

## 2021-04-13 PROCEDURE — 1124F ACP DISCUSS-NO DSCNMKR DOCD: CPT | Performed by: SURGERY

## 2021-04-13 PROCEDURE — 99024 POSTOP FOLLOW-UP VISIT: CPT | Performed by: SURGERY
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
## 2021-04-14 PROBLEM — M54.30 SCIATICA: Status: ACTIVE | Noted: 2021-04-14

## 2021-06-16 ENCOUNTER — LAB REQUISITION (OUTPATIENT)
Dept: LAB | Facility: HOSPITAL | Age: 76
End: 2021-06-16
Payer: MEDICARE

## 2021-06-16 DIAGNOSIS — I10 ESSENTIAL (PRIMARY) HYPERTENSION: ICD-10-CM

## 2021-06-16 DIAGNOSIS — C61 MALIGNANT NEOPLASM OF PROSTATE (HCC): ICD-10-CM

## 2021-06-16 LAB
ALBUMIN SERPL BCP-MCNC: 4 G/DL (ref 3.5–5)
ALP SERPL-CCNC: 63 U/L (ref 46–116)
ALT SERPL W P-5'-P-CCNC: 22 U/L (ref 12–78)
ANION GAP SERPL CALCULATED.3IONS-SCNC: 10 MMOL/L (ref 4–13)
AST SERPL W P-5'-P-CCNC: 19 U/L (ref 5–45)
BILIRUB SERPL-MCNC: 0.44 MG/DL (ref 0.2–1)
BUN SERPL-MCNC: 15 MG/DL (ref 5–25)
CALCIUM SERPL-MCNC: 9.5 MG/DL (ref 8.3–10.1)
CHLORIDE SERPL-SCNC: 108 MMOL/L (ref 100–108)
CO2 SERPL-SCNC: 24 MMOL/L (ref 21–32)
CREAT SERPL-MCNC: 0.84 MG/DL (ref 0.6–1.3)
GFR SERPL CREATININE-BSD FRML MDRD: 84 ML/MIN/1.73SQ M
GLUCOSE SERPL-MCNC: 56 MG/DL (ref 65–140)
POTASSIUM SERPL-SCNC: 4.7 MMOL/L (ref 3.5–5.3)
PROT SERPL-MCNC: 6.8 G/DL (ref 6.4–8.2)
PSA SERPL-MCNC: 0.1 NG/ML (ref 0–4)
SODIUM SERPL-SCNC: 142 MMOL/L (ref 136–145)

## 2021-06-16 PROCEDURE — G0103 PSA SCREENING: HCPCS | Performed by: INTERNAL MEDICINE

## 2021-06-16 PROCEDURE — 80053 COMPREHEN METABOLIC PANEL: CPT | Performed by: INTERNAL MEDICINE

## 2021-07-16 ENCOUNTER — TELEPHONE (OUTPATIENT)
Dept: UROLOGY | Facility: HOSPITAL | Age: 76
End: 2021-07-16

## 2021-07-16 NOTE — TELEPHONE ENCOUNTER
Working Recall List, 1st attempt to contact pt, lmom to call the office to sched an appointment with Cole Carias @ [facility] for Return in about 3 months (around 5/17/2021) for Recheck, No appt spot held for this patient

## 2021-07-23 NOTE — TELEPHONE ENCOUNTER
FYI: patient returned call patient has been scheduled 10/13/21 at [place]  Patient did state he will have a repeat of his psa in Aug and would like the AP to review

## 2021-08-24 ENCOUNTER — APPOINTMENT (OUTPATIENT)
Dept: LAB | Age: 76
End: 2021-08-24
Payer: MEDICARE

## 2021-08-24 DIAGNOSIS — R97.20 ELEVATED PROSTATE SPECIFIC ANTIGEN (PSA): ICD-10-CM

## 2021-08-24 DIAGNOSIS — C61 MALIGNANT NEOPLASM OF PROSTATE (HCC): ICD-10-CM

## 2021-08-24 LAB — PSA SERPL-MCNC: 0.1 NG/ML (ref 0–4)

## 2021-08-24 PROCEDURE — 36415 COLL VENOUS BLD VENIPUNCTURE: CPT

## 2021-08-24 PROCEDURE — 84153 ASSAY OF PSA TOTAL: CPT

## 2021-09-01 NOTE — TELEPHONE ENCOUNTER
Pt's wife with pt in the back ground called regarding letter change of appointment they are requesting appointment with Jair Ambrosio only by recommendation of Dr Bui,I was unable to accommodate due to scheduling protocol

## 2021-09-03 NOTE — TELEPHONE ENCOUNTER
Patient's wife is calling to say there is movement in his psa and would like to see Dr Naomi Coles only  Did explain office new model but she is adamant about seeing Naomi Coles  Stated he was a previous [place] patient and is a cancer survivor

## 2021-09-08 NOTE — TELEPHONE ENCOUNTER
I called pt to try to set up an appointment for him  I re-explained what staff had advised in previous notes  I advised we would be able to get him in to see a provider to discuss his increase in PSA  but the MD schedule was limited  PT was very upset  I gave him several options at several locations  Pt refused and stated I was blaming him for the our schedule being this way  I stated I was just trying to explain where we stand with our schedule  If he was unable to make the times provided there was nothing I can do but put him on a waiting list  Pt stated he was told by several doctors that he needed to see a urologist  Pt agreed to 10/6 with Joretta Patches  I apologized for any miscommunication pt stated this was our fault  We were supposed to reestablish him the a doctor after Jasmina left  He said apologizing will not help him  Pt wants to be called if something sooner opens up

## 2021-09-14 ENCOUNTER — TELEPHONE (OUTPATIENT)
Dept: UROLOGY | Facility: MEDICAL CENTER | Age: 76
End: 2021-09-14

## 2021-09-14 NOTE — TELEPHONE ENCOUNTER
Patient of Dr Katiana Mackey at [place]    Patient's wife called stating she would like for her  to see Dr Emeli Hernadez or Dr Rosie Mayorga  Patient does have an appointment with Dr Carey Joe at the [place] office 10/06  She does not want to cancel it but she wants to know if either DR Emeli Hernadez or DR Rosie Mayorga would take his case  Patient is a prostate cancer survivor and now his psa levels are rising again and patient is very concerned and only wants the doctors he is familiar with at the practice  He has seen a few providers in the practice  He wants a call back to see if this can be arranged    He can be reached at [phone] (

## 2021-09-14 NOTE — TELEPHONE ENCOUNTER
Patient returned call  Patient has been scheduled in one of Dr Capps Shall slots 9/15 @ 2:45pm Andres

## 2021-09-14 NOTE — TELEPHONE ENCOUNTER
Attempted to return call  Call went straight to voicemail  Left message for patient to return call  Dr Violet Garza does have some availability tomorrow  If still available when patient calls back, can be offered one of those spots

## 2021-09-15 ENCOUNTER — OFFICE VISIT (OUTPATIENT)
Dept: UROLOGY | Facility: AMBULATORY SURGERY CENTER | Age: 76
End: 2021-09-15
Payer: MEDICARE

## 2021-09-15 VITALS
HEIGHT: 72 IN | WEIGHT: 189 LBS | SYSTOLIC BLOOD PRESSURE: 130 MMHG | BODY MASS INDEX: 25.6 KG/M2 | DIASTOLIC BLOOD PRESSURE: 70 MMHG | HEART RATE: 84 BPM

## 2021-09-15 DIAGNOSIS — C61 PROSTATE CANCER (HCC): Primary | ICD-10-CM

## 2021-09-15 PROCEDURE — 99214 OFFICE O/P EST MOD 30 MIN: CPT | Performed by: UROLOGY

## 2021-09-15 RX ORDER — OMEPRAZOLE 20 MG/1
20 CAPSULE, DELAYED RELEASE ORAL DAILY
COMMUNITY
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
## 2021-11-04 ENCOUNTER — ESTABLISHED COMPREHENSIVE EXAM (OUTPATIENT)
Dept: URBAN - METROPOLITAN AREA CLINIC 6 | Facility: CLINIC | Age: 76
End: 2021-11-04

## 2021-11-04 DIAGNOSIS — H43.812: ICD-10-CM

## 2021-11-04 DIAGNOSIS — H25.813: ICD-10-CM

## 2021-11-04 PROCEDURE — G8427 DOCREV CUR MEDS BY ELIG CLIN: HCPCS

## 2021-11-04 PROCEDURE — 92015 DETERMINE REFRACTIVE STATE: CPT

## 2021-11-04 PROCEDURE — 92014 COMPRE OPH EXAM EST PT 1/>: CPT

## 2021-11-04 PROCEDURE — 1036F TOBACCO NON-USER: CPT

## 2021-11-04 ASSESSMENT — VISUAL ACUITY
OS_SC: 20/25
OS_CC: J1+
OD_CC: J1
OD_SC: 20/30+2

## 2021-11-04 ASSESSMENT — TONOMETRY
OS_IOP_MMHG: 11
OD_IOP_MMHG: 11

## 2021-11-29 ENCOUNTER — TELEPHONE (OUTPATIENT)
Dept: UROLOGY | Facility: AMBULATORY SURGERY CENTER | Age: 76
End: 2021-11-29

## 2021-12-15 ENCOUNTER — APPOINTMENT (OUTPATIENT)
Dept: LAB | Age: 76
End: 2021-12-15
Payer: MEDICARE

## 2021-12-15 DIAGNOSIS — C61 PROSTATE CANCER (HCC): ICD-10-CM

## 2021-12-15 LAB — PSA SERPL-MCNC: 0.1 NG/ML (ref 0–4)

## 2021-12-15 PROCEDURE — 84153 ASSAY OF PSA TOTAL: CPT

## 2021-12-29 ENCOUNTER — OFFICE VISIT (OUTPATIENT)
Dept: UROLOGY | Facility: AMBULATORY SURGERY CENTER | Age: 76
End: 2021-12-29
Payer: MEDICARE

## 2021-12-29 VITALS
BODY MASS INDEX: 26.14 KG/M2 | DIASTOLIC BLOOD PRESSURE: 82 MMHG | SYSTOLIC BLOOD PRESSURE: 132 MMHG | HEIGHT: 72 IN | WEIGHT: 193 LBS

## 2021-12-29 DIAGNOSIS — C61 PROSTATE CANCER (HCC): Primary | ICD-10-CM

## 2021-12-29 PROCEDURE — 99213 OFFICE O/P EST LOW 20 MIN: CPT | Performed by: UROLOGY

## 2022-01-12 ENCOUNTER — APPOINTMENT (OUTPATIENT)
Dept: LAB | Age: 77
End: 2022-01-12
Payer: MEDICARE

## 2022-01-12 DIAGNOSIS — C44.92 SQUAMOUS CELL CARCINOMA OF SKIN: ICD-10-CM

## 2022-01-12 DIAGNOSIS — D50.9 IRON DEFICIENCY ANEMIA, UNSPECIFIED IRON DEFICIENCY ANEMIA TYPE: ICD-10-CM

## 2022-01-12 LAB
ALBUMIN SERPL BCP-MCNC: 4.1 G/DL (ref 3.5–5)
ALP SERPL-CCNC: 58 U/L (ref 46–116)
ALT SERPL W P-5'-P-CCNC: 26 U/L (ref 12–78)
ANION GAP SERPL CALCULATED.3IONS-SCNC: 1 MMOL/L (ref 4–13)
AST SERPL W P-5'-P-CCNC: 16 U/L (ref 5–45)
BASOPHILS # BLD AUTO: 0.07 THOUSANDS/ΜL (ref 0–0.1)
BASOPHILS NFR BLD AUTO: 1 % (ref 0–1)
BILIRUB SERPL-MCNC: 0.69 MG/DL (ref 0.2–1)
BUN SERPL-MCNC: 17 MG/DL (ref 5–25)
CALCIUM SERPL-MCNC: 10.2 MG/DL (ref 8.3–10.1)
CHLORIDE SERPL-SCNC: 107 MMOL/L (ref 100–108)
CO2 SERPL-SCNC: 32 MMOL/L (ref 21–32)
CREAT SERPL-MCNC: 1.01 MG/DL (ref 0.6–1.3)
EOSINOPHIL # BLD AUTO: 0.3 THOUSAND/ΜL (ref 0–0.61)
EOSINOPHIL NFR BLD AUTO: 4 % (ref 0–6)
ERYTHROCYTE [DISTWIDTH] IN BLOOD BY AUTOMATED COUNT: 12.6 % (ref 11.6–15.1)
ERYTHROCYTE [SEDIMENTATION RATE] IN BLOOD: 7 MM/HOUR (ref 0–19)
GFR SERPL CREATININE-BSD FRML MDRD: 71 ML/MIN/1.73SQ M
GLUCOSE P FAST SERPL-MCNC: 95 MG/DL (ref 65–99)
HCT VFR BLD AUTO: 51.9 % (ref 36.5–49.3)
HGB BLD-MCNC: 17.2 G/DL (ref 12–17)
IMM GRANULOCYTES # BLD AUTO: 0.01 THOUSAND/UL (ref 0–0.2)
IMM GRANULOCYTES NFR BLD AUTO: 0 % (ref 0–2)
LYMPHOCYTES # BLD AUTO: 2.29 THOUSANDS/ΜL (ref 0.6–4.47)
LYMPHOCYTES NFR BLD AUTO: 32 % (ref 14–44)
MCH RBC QN AUTO: 31.2 PG (ref 26.8–34.3)
MCHC RBC AUTO-ENTMCNC: 33.1 G/DL (ref 31.4–37.4)
MCV RBC AUTO: 94 FL (ref 82–98)
MONOCYTES # BLD AUTO: 0.68 THOUSAND/ΜL (ref 0.17–1.22)
MONOCYTES NFR BLD AUTO: 10 % (ref 4–12)
NEUTROPHILS # BLD AUTO: 3.75 THOUSANDS/ΜL (ref 1.85–7.62)
NEUTS SEG NFR BLD AUTO: 53 % (ref 43–75)
NRBC BLD AUTO-RTO: 0 /100 WBCS
PLATELET # BLD AUTO: 269 THOUSANDS/UL (ref 149–390)
PMV BLD AUTO: 11 FL (ref 8.9–12.7)
POTASSIUM SERPL-SCNC: 4.6 MMOL/L (ref 3.5–5.3)
PROT SERPL-MCNC: 7.4 G/DL (ref 6.4–8.2)
RBC # BLD AUTO: 5.52 MILLION/UL (ref 3.88–5.62)
SODIUM SERPL-SCNC: 140 MMOL/L (ref 136–145)
WBC # BLD AUTO: 7.1 THOUSAND/UL (ref 4.31–10.16)

## 2022-01-12 PROCEDURE — 84165 PROTEIN E-PHORESIS SERUM: CPT | Performed by: PATHOLOGY

## 2022-01-12 PROCEDURE — 84165 PROTEIN E-PHORESIS SERUM: CPT

## 2022-01-12 PROCEDURE — 85652 RBC SED RATE AUTOMATED: CPT

## 2022-01-12 PROCEDURE — 36415 COLL VENOUS BLD VENIPUNCTURE: CPT

## 2022-01-12 PROCEDURE — 85025 COMPLETE CBC W/AUTO DIFF WBC: CPT

## 2022-01-12 PROCEDURE — 80053 COMPREHEN METABOLIC PANEL: CPT

## 2022-01-13 LAB
ALBUMIN SERPL ELPH-MCNC: 4.76 G/DL (ref 3.5–5)
ALBUMIN SERPL ELPH-MCNC: 66.1 % (ref 52–65)
ALPHA1 GLOB SERPL ELPH-MCNC: 0.28 G/DL (ref 0.1–0.4)
ALPHA1 GLOB SERPL ELPH-MCNC: 3.9 % (ref 2.5–5)
ALPHA2 GLOB SERPL ELPH-MCNC: 0.66 G/DL (ref 0.4–1.2)
ALPHA2 GLOB SERPL ELPH-MCNC: 9.1 % (ref 7–13)
BETA GLOB ABNORMAL SERPL ELPH-MCNC: 0.39 G/DL (ref 0.4–0.8)
BETA1 GLOB SERPL ELPH-MCNC: 5.4 % (ref 5–13)
BETA2 GLOB SERPL ELPH-MCNC: 4.8 % (ref 2–8)
BETA2+GAMMA GLOB SERPL ELPH-MCNC: 0.35 G/DL (ref 0.2–0.5)
GAMMA GLOB ABNORMAL SERPL ELPH-MCNC: 0.77 G/DL (ref 0.5–1.6)
GAMMA GLOB SERPL ELPH-MCNC: 10.7 % (ref 12–22)
IGG/ALB SER: 1.95 {RATIO} (ref 1.1–1.8)
PROT PATTERN SERPL ELPH-IMP: ABNORMAL
PROT SERPL-MCNC: 7.2 G/DL (ref 6.4–8.2)

## 2022-01-18 ENCOUNTER — APPOINTMENT (OUTPATIENT)
Dept: RADIOLOGY | Age: 77
End: 2022-01-18
Payer: MEDICARE

## 2022-01-18 ENCOUNTER — APPOINTMENT (OUTPATIENT)
Dept: LAB | Age: 77
End: 2022-01-18
Payer: MEDICARE

## 2022-01-18 DIAGNOSIS — D80.1 COMMON VARIABLE AGAMMAGLOBULINEMIA (HCC): ICD-10-CM

## 2022-01-18 LAB
IGA SERPL-MCNC: 196 MG/DL (ref 70–400)
IGG SERPL-MCNC: 681 MG/DL (ref 700–1600)
IGM SERPL-MCNC: 98 MG/DL (ref 40–230)

## 2022-01-18 PROCEDURE — 36415 COLL VENOUS BLD VENIPUNCTURE: CPT

## 2022-01-18 PROCEDURE — 71046 X-RAY EXAM CHEST 2 VIEWS: CPT

## 2022-01-18 PROCEDURE — 84166 PROTEIN E-PHORESIS/URINE/CSF: CPT | Performed by: PATHOLOGY

## 2022-01-18 PROCEDURE — 82784 ASSAY IGA/IGD/IGG/IGM EACH: CPT

## 2022-01-28 ENCOUNTER — APPOINTMENT (OUTPATIENT)
Dept: LAB | Age: 77
End: 2022-01-28
Payer: MEDICARE

## 2022-01-28 DIAGNOSIS — D80.3 SELECTIVE DEFICIENCY OF IGG (HCC): ICD-10-CM

## 2022-01-28 PROCEDURE — 36415 COLL VENOUS BLD VENIPUNCTURE: CPT

## 2022-01-28 PROCEDURE — 82787 IGG 1 2 3 OR 4 EACH: CPT

## 2022-01-28 PROCEDURE — 82784 ASSAY IGA/IGD/IGG/IGM EACH: CPT

## 2022-01-31 LAB
IGG SERPL-MCNC: 641 MG/DL (ref 603–1613)
IGG1 SER-MCNC: 341 MG/DL (ref 248–810)
IGG2 SER-MCNC: 200 MG/DL (ref 130–555)
IGG3 SER-MCNC: 64 MG/DL (ref 15–102)
IGG4 SER-MCNC: 33 MG/DL (ref 2–96)

## 2022-02-25 ENCOUNTER — LAB REQUISITION (OUTPATIENT)
Dept: LAB | Facility: HOSPITAL | Age: 77
End: 2022-02-25
Payer: MEDICARE

## 2022-02-25 DIAGNOSIS — I10 ESSENTIAL (PRIMARY) HYPERTENSION: ICD-10-CM

## 2022-02-25 DIAGNOSIS — C61 MALIGNANT NEOPLASM OF PROSTATE (HCC): ICD-10-CM

## 2022-02-25 LAB
ERYTHROCYTE [DISTWIDTH] IN BLOOD BY AUTOMATED COUNT: 12.6 % (ref 11.6–15.1)
HCT VFR BLD AUTO: 49.3 % (ref 36.5–49.3)
HGB BLD-MCNC: 16.1 G/DL (ref 12–17)
MCH RBC QN AUTO: 30.8 PG (ref 26.8–34.3)
MCHC RBC AUTO-ENTMCNC: 32.7 G/DL (ref 31.4–37.4)
MCV RBC AUTO: 94 FL (ref 82–98)
PLATELET # BLD AUTO: 342 THOUSANDS/UL (ref 149–390)
PMV BLD AUTO: 10.6 FL (ref 8.9–12.7)
RBC # BLD AUTO: 5.23 MILLION/UL (ref 3.88–5.62)
WBC # BLD AUTO: 7.11 THOUSAND/UL (ref 4.31–10.16)

## 2022-02-25 PROCEDURE — 85027 COMPLETE CBC AUTOMATED: CPT | Performed by: INTERNAL MEDICINE

## 2022-06-22 ENCOUNTER — TELEPHONE (OUTPATIENT)
Dept: UROLOGY | Facility: AMBULATORY SURGERY CENTER | Age: 77
End: 2022-06-22

## 2022-06-22 NOTE — TELEPHONE ENCOUNTER
Called pt to let him know that he does need his PSA completed prior to his appointment  Pt stated that he will complete it prior to his appointment next week

## 2022-06-23 ENCOUNTER — APPOINTMENT (OUTPATIENT)
Dept: LAB | Age: 77
End: 2022-06-23
Payer: MEDICARE

## 2022-06-23 DIAGNOSIS — C61 PROSTATE CANCER (HCC): ICD-10-CM

## 2022-06-23 LAB — PSA SERPL-MCNC: 0.1 NG/ML (ref 0–4)

## 2022-06-23 PROCEDURE — 84153 ASSAY OF PSA TOTAL: CPT

## 2022-06-30 ENCOUNTER — OFFICE VISIT (OUTPATIENT)
Dept: UROLOGY | Facility: AMBULATORY SURGERY CENTER | Age: 77
End: 2022-06-30
Payer: MEDICARE

## 2022-06-30 VITALS
DIASTOLIC BLOOD PRESSURE: 80 MMHG | HEIGHT: 72 IN | SYSTOLIC BLOOD PRESSURE: 132 MMHG | HEART RATE: 88 BPM | WEIGHT: 189 LBS | BODY MASS INDEX: 25.6 KG/M2

## 2022-06-30 DIAGNOSIS — N39.3 STRESS INCONTINENCE: ICD-10-CM

## 2022-06-30 DIAGNOSIS — C61 PROSTATE CANCER (HCC): Primary | ICD-10-CM

## 2022-06-30 PROCEDURE — 99213 OFFICE O/P EST LOW 20 MIN: CPT | Performed by: NURSE PRACTITIONER

## 2022-06-30 RX ORDER — SILDENAFIL 100 MG/1
TABLET, FILM COATED ORAL
COMMUNITY
Start: 2022-06-29

## 2022-06-30 RX ORDER — FLUOCINOLONE ACETONIDE 0.25 MG/G
OINTMENT TOPICAL
COMMUNITY
Start: 2022-04-25
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
## 2022-07-27 ENCOUNTER — APPOINTMENT (EMERGENCY)
Dept: RADIOLOGY | Facility: HOSPITAL | Age: 77
DRG: 178 | End: 2022-07-27
Payer: MEDICARE

## 2022-07-27 ENCOUNTER — HOSPITAL ENCOUNTER (INPATIENT)
Facility: HOSPITAL | Age: 77
LOS: 2 days | Discharge: LEFT BEFORE DISCHARGE | DRG: 178 | End: 2022-07-29
Attending: EMERGENCY MEDICINE | Admitting: INTERNAL MEDICINE
Payer: MEDICARE

## 2022-07-27 ENCOUNTER — APPOINTMENT (INPATIENT)
Dept: RADIOLOGY | Facility: HOSPITAL | Age: 77
DRG: 178 | End: 2022-07-27
Payer: MEDICARE

## 2022-07-27 DIAGNOSIS — N39.0 SEPSIS SECONDARY TO UTI (HCC): Primary | ICD-10-CM

## 2022-07-27 DIAGNOSIS — A41.9 SEPSIS SECONDARY TO UTI (HCC): Primary | ICD-10-CM

## 2022-07-27 DIAGNOSIS — N39.0 URINARY TRACT INFECTION WITHOUT HEMATURIA, SITE UNSPECIFIED: ICD-10-CM

## 2022-07-27 DIAGNOSIS — J69.0 ASPIRATION PNEUMONIA (HCC): ICD-10-CM

## 2022-07-27 DIAGNOSIS — R78.81 BACTEREMIA: ICD-10-CM

## 2022-07-27 PROBLEM — R50.9 FEBRILE ILLNESS: Status: ACTIVE | Noted: 2022-07-27

## 2022-07-27 LAB
ALBUMIN SERPL BCP-MCNC: 3.3 G/DL (ref 3.5–5)
ALP SERPL-CCNC: 81 U/L (ref 46–116)
ALT SERPL W P-5'-P-CCNC: 49 U/L (ref 12–78)
ANION GAP SERPL CALCULATED.3IONS-SCNC: 4 MMOL/L (ref 4–13)
APTT PPP: 32 SECONDS (ref 23–37)
AST SERPL W P-5'-P-CCNC: 49 U/L (ref 5–45)
ATRIAL RATE: 116 BPM
ATRIAL RATE: 84 BPM
ATRIAL RATE: 84 BPM
BACTERIA UR QL AUTO: ABNORMAL /HPF
BASOPHILS # BLD AUTO: 0.03 THOUSANDS/ΜL (ref 0–0.1)
BASOPHILS NFR BLD AUTO: 0 % (ref 0–1)
BILIRUB SERPL-MCNC: 0.63 MG/DL (ref 0.2–1)
BILIRUB UR QL STRIP: NEGATIVE
BUN SERPL-MCNC: 21 MG/DL (ref 5–25)
CALCIUM ALBUM COR SERPL-MCNC: 10.5 MG/DL (ref 8.3–10.1)
CALCIUM SERPL-MCNC: 9.9 MG/DL (ref 8.3–10.1)
CHLORIDE SERPL-SCNC: 110 MMOL/L (ref 96–108)
CLARITY UR: CLEAR
CO2 SERPL-SCNC: 26 MMOL/L (ref 21–32)
COLOR UR: ABNORMAL
CREAT SERPL-MCNC: 1.16 MG/DL (ref 0.6–1.3)
EOSINOPHIL # BLD AUTO: 0.04 THOUSAND/ΜL (ref 0–0.61)
EOSINOPHIL NFR BLD AUTO: 1 % (ref 0–6)
ERYTHROCYTE [DISTWIDTH] IN BLOOD BY AUTOMATED COUNT: 12.3 % (ref 11.6–15.1)
FLUAV RNA RESP QL NAA+PROBE: NEGATIVE
FLUBV RNA RESP QL NAA+PROBE: NEGATIVE
GFR SERPL CREATININE-BSD FRML MDRD: 59 ML/MIN/1.73SQ M
GLUCOSE SERPL-MCNC: 128 MG/DL (ref 65–140)
GLUCOSE SERPL-MCNC: 98 MG/DL (ref 65–140)
GLUCOSE UR STRIP-MCNC: NEGATIVE MG/DL
HCT VFR BLD AUTO: 47.2 % (ref 36.5–49.3)
HGB BLD-MCNC: 15.5 G/DL (ref 12–17)
HGB UR QL STRIP.AUTO: ABNORMAL
IMM GRANULOCYTES # BLD AUTO: 0.03 THOUSAND/UL (ref 0–0.2)
IMM GRANULOCYTES NFR BLD AUTO: 0 % (ref 0–2)
INR PPP: 1.01 (ref 0.84–1.19)
KETONES UR STRIP-MCNC: NEGATIVE MG/DL
LACTATE SERPL-SCNC: 1.4 MMOL/L (ref 0.5–2)
LEUKOCYTE ESTERASE UR QL STRIP: ABNORMAL
LYMPHOCYTES # BLD AUTO: 0.9 THOUSANDS/ΜL (ref 0.6–4.47)
LYMPHOCYTES NFR BLD AUTO: 13 % (ref 14–44)
MCH RBC QN AUTO: 30.8 PG (ref 26.8–34.3)
MCHC RBC AUTO-ENTMCNC: 32.8 G/DL (ref 31.4–37.4)
MCV RBC AUTO: 94 FL (ref 82–98)
MONOCYTES # BLD AUTO: 0.21 THOUSAND/ΜL (ref 0.17–1.22)
MONOCYTES NFR BLD AUTO: 3 % (ref 4–12)
NEUTROPHILS # BLD AUTO: 5.91 THOUSANDS/ΜL (ref 1.85–7.62)
NEUTS SEG NFR BLD AUTO: 83 % (ref 43–75)
NITRITE UR QL STRIP: POSITIVE
NON-SQ EPI CELLS URNS QL MICRO: ABNORMAL /HPF
NRBC BLD AUTO-RTO: 0 /100 WBCS
P AXIS: 53 DEGREES
P AXIS: 53 DEGREES
P AXIS: 54 DEGREES
PH UR STRIP.AUTO: 5.5 [PH]
PLATELET # BLD AUTO: 263 THOUSANDS/UL (ref 149–390)
PMV BLD AUTO: 10.1 FL (ref 8.9–12.7)
POTASSIUM SERPL-SCNC: 4 MMOL/L (ref 3.5–5.3)
PR INTERVAL: 134 MS
PR INTERVAL: 134 MS
PR INTERVAL: 182 MS
PROCALCITONIN SERPL-MCNC: 0.34 NG/ML
PROT SERPL-MCNC: 6.9 G/DL (ref 6.4–8.4)
PROT UR STRIP-MCNC: ABNORMAL MG/DL
PROTHROMBIN TIME: 13.5 SECONDS (ref 11.6–14.5)
QRS AXIS: 3 DEGREES
QRS AXIS: 47 DEGREES
QRS AXIS: 47 DEGREES
QRSD INTERVAL: 106 MS
QRSD INTERVAL: 106 MS
QRSD INTERVAL: 94 MS
QT INTERVAL: 292 MS
QT INTERVAL: 352 MS
QT INTERVAL: 352 MS
QTC INTERVAL: 405 MS
QTC INTERVAL: 415 MS
QTC INTERVAL: 415 MS
RBC # BLD AUTO: 5.03 MILLION/UL (ref 3.88–5.62)
RBC #/AREA URNS AUTO: ABNORMAL /HPF
RSV RNA RESP QL NAA+PROBE: NEGATIVE
SARS-COV-2 RNA RESP QL NAA+PROBE: NEGATIVE
SODIUM SERPL-SCNC: 140 MMOL/L (ref 135–147)
SP GR UR STRIP.AUTO: 1.02 (ref 1–1.03)
T WAVE AXIS: 43 DEGREES
T WAVE AXIS: 50 DEGREES
T WAVE AXIS: 50 DEGREES
UROBILINOGEN UR STRIP-ACNC: <2 MG/DL
VENTRICULAR RATE: 116 BPM
VENTRICULAR RATE: 84 BPM
VENTRICULAR RATE: 84 BPM
WBC # BLD AUTO: 7.12 THOUSAND/UL (ref 4.31–10.16)
WBC #/AREA URNS AUTO: ABNORMAL /HPF

## 2022-07-27 PROCEDURE — 87186 SC STD MICRODIL/AGAR DIL: CPT

## 2022-07-27 PROCEDURE — 99291 CRITICAL CARE FIRST HOUR: CPT | Performed by: EMERGENCY MEDICINE

## 2022-07-27 PROCEDURE — 99223 1ST HOSP IP/OBS HIGH 75: CPT | Performed by: INTERNAL MEDICINE

## 2022-07-27 PROCEDURE — 87154 CUL TYP ID BLD PTHGN 6+ TRGT: CPT

## 2022-07-27 PROCEDURE — G1004 CDSM NDSC: HCPCS

## 2022-07-27 PROCEDURE — 83605 ASSAY OF LACTIC ACID: CPT

## 2022-07-27 PROCEDURE — 99285 EMERGENCY DEPT VISIT HI MDM: CPT

## 2022-07-27 PROCEDURE — 84145 PROCALCITONIN (PCT): CPT

## 2022-07-27 PROCEDURE — 93005 ELECTROCARDIOGRAM TRACING: CPT

## 2022-07-27 PROCEDURE — 92610 EVALUATE SWALLOWING FUNCTION: CPT

## 2022-07-27 PROCEDURE — 93010 ELECTROCARDIOGRAM REPORT: CPT | Performed by: INTERNAL MEDICINE

## 2022-07-27 PROCEDURE — 87077 CULTURE AEROBIC IDENTIFY: CPT

## 2022-07-27 PROCEDURE — 71250 CT THORAX DX C-: CPT

## 2022-07-27 PROCEDURE — 96365 THER/PROPH/DIAG IV INF INIT: CPT

## 2022-07-27 PROCEDURE — 85730 THROMBOPLASTIN TIME PARTIAL: CPT

## 2022-07-27 PROCEDURE — 85610 PROTHROMBIN TIME: CPT

## 2022-07-27 PROCEDURE — 96361 HYDRATE IV INFUSION ADD-ON: CPT

## 2022-07-27 PROCEDURE — 87040 BLOOD CULTURE FOR BACTERIA: CPT

## 2022-07-27 PROCEDURE — 87086 URINE CULTURE/COLONY COUNT: CPT

## 2022-07-27 PROCEDURE — 74176 CT ABD & PELVIS W/O CONTRAST: CPT

## 2022-07-27 PROCEDURE — 80053 COMPREHEN METABOLIC PANEL: CPT

## 2022-07-27 PROCEDURE — 0241U HB NFCT DS VIR RESP RNA 4 TRGT: CPT

## 2022-07-27 PROCEDURE — 36415 COLL VENOUS BLD VENIPUNCTURE: CPT

## 2022-07-27 PROCEDURE — 71045 X-RAY EXAM CHEST 1 VIEW: CPT

## 2022-07-27 PROCEDURE — 96367 TX/PROPH/DG ADDL SEQ IV INF: CPT

## 2022-07-27 PROCEDURE — 85025 COMPLETE CBC W/AUTO DIFF WBC: CPT

## 2022-07-27 PROCEDURE — 82948 REAGENT STRIP/BLOOD GLUCOSE: CPT

## 2022-07-27 PROCEDURE — 81001 URINALYSIS AUTO W/SCOPE: CPT

## 2022-07-27 RX ORDER — OXYBUTYNIN CHLORIDE 5 MG/1
5 TABLET, EXTENDED RELEASE ORAL DAILY
Status: DISCONTINUED | OUTPATIENT
Start: 2022-07-27 | End: 2022-07-29 | Stop reason: HOSPADM

## 2022-07-27 RX ORDER — PANTOPRAZOLE SODIUM 20 MG/1
20 TABLET, DELAYED RELEASE ORAL
Status: DISCONTINUED | OUTPATIENT
Start: 2022-07-27 | End: 2022-07-29 | Stop reason: HOSPADM

## 2022-07-27 RX ORDER — ACETAMINOPHEN 325 MG/1
975 TABLET ORAL ONCE
Status: COMPLETED | OUTPATIENT
Start: 2022-07-27 | End: 2022-07-27

## 2022-07-27 RX ORDER — ACETAMINOPHEN 325 MG/1
650 TABLET ORAL EVERY 4 HOURS PRN
Status: DISCONTINUED | OUTPATIENT
Start: 2022-07-27 | End: 2022-07-29 | Stop reason: HOSPADM

## 2022-07-27 RX ORDER — SODIUM CHLORIDE 9 MG/ML
75 INJECTION, SOLUTION INTRAVENOUS CONTINUOUS
Status: DISCONTINUED | OUTPATIENT
Start: 2022-07-27 | End: 2022-07-29 | Stop reason: HOSPADM

## 2022-07-27 RX ORDER — ENOXAPARIN SODIUM 100 MG/ML
40 INJECTION SUBCUTANEOUS DAILY
Status: DISCONTINUED | OUTPATIENT
Start: 2022-07-27 | End: 2022-07-29 | Stop reason: HOSPADM

## 2022-07-27 RX ORDER — SODIUM CHLORIDE, SODIUM GLUCONATE, SODIUM ACETATE, POTASSIUM CHLORIDE, MAGNESIUM CHLORIDE, SODIUM PHOSPHATE, DIBASIC, AND POTASSIUM PHOSPHATE .53; .5; .37; .037; .03; .012; .00082 G/100ML; G/100ML; G/100ML; G/100ML; G/100ML; G/100ML; G/100ML
1000 INJECTION, SOLUTION INTRAVENOUS ONCE
Status: COMPLETED | OUTPATIENT
Start: 2022-07-27 | End: 2022-07-27

## 2022-07-27 RX ORDER — SODIUM CHLORIDE, SODIUM GLUCONATE, SODIUM ACETATE, POTASSIUM CHLORIDE, MAGNESIUM CHLORIDE, SODIUM PHOSPHATE, DIBASIC, AND POTASSIUM PHOSPHATE .53; .5; .37; .037; .03; .012; .00082 G/100ML; G/100ML; G/100ML; G/100ML; G/100ML; G/100ML; G/100ML
1000 INJECTION, SOLUTION INTRAVENOUS ONCE
Status: DISCONTINUED | OUTPATIENT
Start: 2022-07-27 | End: 2022-07-27

## 2022-07-27 RX ORDER — ONDANSETRON 2 MG/ML
4 INJECTION INTRAMUSCULAR; INTRAVENOUS EVERY 4 HOURS PRN
Status: DISCONTINUED | OUTPATIENT
Start: 2022-07-27 | End: 2022-07-29 | Stop reason: HOSPADM

## 2022-07-27 RX ORDER — METRONIDAZOLE 500 MG/1
500 TABLET ORAL EVERY 8 HOURS SCHEDULED
Status: DISCONTINUED | OUTPATIENT
Start: 2022-07-27 | End: 2022-07-29 | Stop reason: HOSPADM

## 2022-07-27 RX ORDER — SODIUM CHLORIDE 9 MG/ML
3 INJECTION INTRAVENOUS
Status: DISCONTINUED | OUTPATIENT
Start: 2022-07-27 | End: 2022-07-29 | Stop reason: HOSPADM

## 2022-07-27 RX ADMIN — METRONIDAZOLE 500 MG: 500 TABLET ORAL at 22:39

## 2022-07-27 RX ADMIN — METRONIDAZOLE 500 MG: 500 TABLET ORAL at 18:06

## 2022-07-27 RX ADMIN — SODIUM CHLORIDE 75 ML/HR: 0.9 INJECTION, SOLUTION INTRAVENOUS at 12:25

## 2022-07-27 RX ADMIN — ACETAMINOPHEN 650 MG: 325 TABLET ORAL at 18:40

## 2022-07-27 RX ADMIN — SODIUM CHLORIDE, SODIUM GLUCONATE, SODIUM ACETATE, POTASSIUM CHLORIDE, MAGNESIUM CHLORIDE, SODIUM PHOSPHATE, DIBASIC, AND POTASSIUM PHOSPHATE 1000 ML: .53; .5; .37; .037; .03; .012; .00082 INJECTION, SOLUTION INTRAVENOUS at 05:15

## 2022-07-27 RX ADMIN — ACETAMINOPHEN 650 MG: 325 TABLET ORAL at 22:38

## 2022-07-27 RX ADMIN — SODIUM CHLORIDE 1000 ML: 0.9 INJECTION, SOLUTION INTRAVENOUS at 06:31

## 2022-07-27 RX ADMIN — ENOXAPARIN SODIUM 40 MG: 40 INJECTION SUBCUTANEOUS at 11:29

## 2022-07-27 RX ADMIN — CEFTRIAXONE 1000 MG: 1 INJECTION, POWDER, FOR SOLUTION INTRAMUSCULAR; INTRAVENOUS at 06:26

## 2022-07-27 RX ADMIN — SODIUM CHLORIDE 1000 ML: 0.9 INJECTION, SOLUTION INTRAVENOUS at 10:19

## 2022-07-27 RX ADMIN — CEFEPIME HYDROCHLORIDE 1000 MG: 1 INJECTION, POWDER, FOR SOLUTION INTRAMUSCULAR; INTRAVENOUS at 21:03

## 2022-07-27 RX ADMIN — ACETAMINOPHEN 975 MG: 325 TABLET ORAL at 05:02

## 2022-07-27 RX ADMIN — ACETAMINOPHEN 975 MG: 325 TABLET ORAL at 11:28

## 2022-07-27 RX ADMIN — OXYBUTYNIN 5 MG: 5 TABLET, FILM COATED, EXTENDED RELEASE ORAL at 12:25

## 2022-07-27 RX ADMIN — PANTOPRAZOLE SODIUM 20 MG: 20 TABLET, DELAYED RELEASE ORAL at 12:25
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
## 2022-07-27 NOTE — ED ATTENDING ATTESTATION
7/27/2022  IHailee MD, saw and evaluated the patient  I have discussed the patient with the resident/non-physician practitioner and agree with the resident's/non-physician practitioner's findings, Plan of Care, and MDM as documented in the resident's/non-physician practitioner's note, except where noted  All available labs and Radiology studies were reviewed  I was present for key portions of any procedure(s) performed by the resident/non-physician practitioner and I was immediately available to provide assistance  At this point I agree with the current assessment done in the Emergency Department  I have conducted an independent evaluation of this patient a history and physical is as follows: This is a 65 y o  old male who presents to the ED for evaluation of fever  Cough and fever since last night  Severe diaphoresis  Had some transient abd pain, nothing any longer  Looks unwell  VS and nursing notes reviewed  General: Appears in NAD, awake, alert, speaking normally in full sentences  Well-nourished, well-developed  Appears stated age  Head: Normocephalic, atraumatic  Eyes: EOMI  Vision grossly normal  No subconjunctival hemorrhages or occular discharge noted  Symmetrical lids  ENT: Atraumatic external nose and ears  No stridor  Normal phonation  No drooling  Normal swallowing  Neck: No JVD  FROM  No goiter  CV: No pallor  Tachycardia  Lungs: CTA SAMEER  Tachypnea  No respiratory distress  MSK: Moving all extremities equally, no peripheral edema  Skin: Moist, intact  No cyanosis  Neuro: Awake, alert, GCS15  CN II-XII grossly intact  Grossly normal gait  Psychiatric/Behavioral: Appropriate mood and affect  A/P: This is a 65 y o  male who presents to the ED for evaluation of fever  Septic work up, UA, CXR  COVID  IVF bolus  Reevaluate and dispo accordingly  PT with transient hypotension  Needed additional IV Bolus  BP improved  Stable for medicine admit       ED Course Critical Care Time  CriticalCare Time  Performed by: Jenise Reese MD  Authorized by: Jenise Reese MD     Critical care provider statement:     Critical care time (minutes):  33    Critical care time was exclusive of:  Separately billable procedures and treating other patients and teaching time    Critical care was necessary to treat or prevent imminent or life-threatening deterioration of the following conditions:  Sepsis    Critical care was time spent personally by me on the following activities:  Obtaining history from patient or surrogate, development of treatment plan with patient or surrogate, evaluation of patient's response to treatment, examination of patient, review of old charts, re-evaluation of patient's condition, ordering and review of radiographic studies, ordering and review of laboratory studies and ordering and performing treatments and interventions

## 2022-07-27 NOTE — ASSESSMENT & PLAN NOTE
Continue with supportive care  Monitor symptoms  Empiric antibiotics started in ED  Differential would include possible aspiration versus urinary tract infection  Speech therapy evaluation prior to diet order  Empiric antibiotic therapy to cover for aspiration and potential urinary tract infection  Dc flagyl if ct neg  Prelim bc positive for gm neg rods   will add cefepime
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
## 2022-07-28 PROBLEM — N39.0 URINARY TRACT INFECTION WITHOUT HEMATURIA: Status: ACTIVE | Noted: 2022-07-28

## 2022-07-28 PROBLEM — J69.0 ASPIRATION PNEUMONIA (HCC): Status: ACTIVE | Noted: 2022-07-28

## 2022-07-28 LAB — PROCALCITONIN SERPL-MCNC: 40.94 NG/ML

## 2022-07-28 PROCEDURE — 84145 PROCALCITONIN (PCT): CPT | Performed by: INTERNAL MEDICINE

## 2022-07-28 PROCEDURE — 97166 OT EVAL MOD COMPLEX 45 MIN: CPT

## 2022-07-28 PROCEDURE — 97163 PT EVAL HIGH COMPLEX 45 MIN: CPT

## 2022-07-28 PROCEDURE — 99232 SBSQ HOSP IP/OBS MODERATE 35: CPT | Performed by: HOSPITALIST

## 2022-07-28 RX ADMIN — METRONIDAZOLE 500 MG: 500 TABLET ORAL at 21:12

## 2022-07-28 RX ADMIN — SODIUM CHLORIDE 75 ML/HR: 0.9 INJECTION, SOLUTION INTRAVENOUS at 08:04

## 2022-07-28 RX ADMIN — ENOXAPARIN SODIUM 40 MG: 40 INJECTION SUBCUTANEOUS at 09:04

## 2022-07-28 RX ADMIN — OXYBUTYNIN 5 MG: 5 TABLET, FILM COATED, EXTENDED RELEASE ORAL at 09:04

## 2022-07-28 RX ADMIN — ACETAMINOPHEN 650 MG: 325 TABLET ORAL at 17:34

## 2022-07-28 RX ADMIN — CEFEPIME HYDROCHLORIDE 1000 MG: 1 INJECTION, POWDER, FOR SOLUTION INTRAMUSCULAR; INTRAVENOUS at 17:28

## 2022-07-28 RX ADMIN — METRONIDAZOLE 500 MG: 500 TABLET ORAL at 06:03

## 2022-07-28 RX ADMIN — PANTOPRAZOLE SODIUM 20 MG: 20 TABLET, DELAYED RELEASE ORAL at 06:03

## 2022-07-28 RX ADMIN — ACETAMINOPHEN 650 MG: 325 TABLET ORAL at 12:27

## 2022-07-28 RX ADMIN — CEFEPIME HYDROCHLORIDE 1000 MG: 1 INJECTION, POWDER, FOR SOLUTION INTRAMUSCULAR; INTRAVENOUS at 06:03

## 2022-07-28 RX ADMIN — METRONIDAZOLE 500 MG: 500 TABLET ORAL at 15:28

## 2022-07-28 RX ADMIN — ACETAMINOPHEN 650 MG: 325 TABLET ORAL at 06:03

## 2022-07-28 NOTE — ASSESSMENT & PLAN NOTE
--UA positive  --CT abdomen as mentioned above  --Urine culture is pending  --Continue iov Cefepime while awaiting culture result

## 2022-07-28 NOTE — PLAN OF CARE
Problem: INFECTION - ADULT  Goal: Absence or prevention of progression during hospitalization  Description: INTERVENTIONS:  - Assess and monitor for signs and symptoms of infection  - Monitor lab/diagnostic results  - Monitor all insertion sites, i e  indwelling lines, tubes, and drains  - Monitor endotracheal if appropriate and nasal secretions for changes in amount and color  - Bastian appropriate cooling/warming therapies per order  - Administer medications as ordered  - Instruct and encourage patient and family to use good hand hygiene technique  - Identify and instruct in appropriate isolation precautions for identified infection/condition  Outcome: Progressing
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
## 2022-07-28 NOTE — PLAN OF CARE
Problem: PHYSICAL THERAPY ADULT  Goal: Performs mobility at highest level of function for planned discharge setting  See evaluation for individualized goals  Description: Treatment/Interventions: Functional transfer training, LE strengthening/ROM, Elevations, Therapeutic exercise, Bed mobility, Gait training, Spoke to nursing, OT, Spoke to case management          See flowsheet documentation for full assessment, interventions and recommendations  Note: Prognosis: Good  Problem List: Decreased strength, Decreased endurance, Impaired balance, Decreased mobility  Assessment: Pt is 65 y o  male admitted with Dx of Febrile illness and currently undergoing w/u  Pt 's comorbidities affecting POC include: Back problem, Carotid artery disorder ([address] ), and Hypertension and personal factors of: steps in the house  Pt's clinical presentation is currently  unstable/unpredictable which is evident in ongoing telem monitoring and abn lab values  Pt presents w/ min overall weakness, incl decreased LE strength, decreased functional endurance and inconsistent amb balance and gait patterns w/ min (A) required at this time during amb  Will cont to follow pt in PT for progressive mobilization to max level of (I), endurance, and safety  Otherwise, anticipate pt will return home w/ available family support upon D/C provided he cont improving w/ mobility skills, safety, and endurance (incl on the steps) and when medically cleared; will follow  Barriers to Discharge: Inaccessible home environment     PT Discharge Recommendation: No rehabilitation needs    See flowsheet documentation for full assessment

## 2022-07-28 NOTE — PLAN OF CARE
Problem: INFECTION - ADULT  Goal: Absence or prevention of progression during hospitalization  Description: INTERVENTIONS:  - Assess and monitor for signs and symptoms of infection  - Monitor lab/diagnostic results  - Monitor all insertion sites, i e  indwelling lines, tubes, and drains  - Monitor endotracheal if appropriate and nasal secretions for changes in amount and color  - [place] appropriate cooling/warming therapies per order  - Administer medications as ordered  - Instruct and encourage patient and family to use good hand hygiene technique  - Identify and instruct in appropriate isolation precautions for identified infection/condition  Outcome: Progressing  Goal: Absence of fever/infection during neutropenic period  Description: INTERVENTIONS:  - Monitor WBC    Outcome: Progressing

## 2022-07-28 NOTE — PROGRESS NOTES
[facility] [facility]  Progress Note - Domenico Eugene 1945, 65 y o  male MRN: [id number]  Unit/Bed#: [facility] 429-01 Encounter: [id number]    VTE Pharmacologic Prophylaxis:   Moderate Risk (Score 3-4) - Pharmacological DVT Prophylaxis Ordered: enoxaparin (Lovenox)  Patient Centered Rounds: I performed bedside rounds with nursing staff today  Discussions with Specialists or Other Care Team Provider: MEGHAN    Education and Discussions with Family / Patient: Updated  (wife) via phone  Time Spent for Care: 30 minutes  More than 50% of total time spent on counseling and coordination of care as described above  Current Length of Stay: 1 day(s)  Current Patient Status: Inpatient   Certification Statement: The patient will continue to require additional inpatient hospital stay due to UTI, possible bactereima and bilateral PNA  Discharge Plan: Anticipate discharge in >72 hrs to home  Code Status: Level 1 - Full Code    Subjective:   Patient requested to be discharged home to visit with his grand kids from [place] before they leave in 3 days order wise denied any discomfort    Objective:     Vitals:   Temp (24hrs), Av °F (37 2 °C), Min:97 6 °F (36 4 °C), Max:101 °F (38 3 °C)    Temp:  [97 6 °F (36 4 °C)-101 °F (38 3 °C)] 98 7 °F (37 1 °C)  HR:  [54-80] 60  Resp:  [14-22] 18  BP: ()/(56-83) 128/70  SpO2:  [90 %-99 %] 97 %  There is no height or weight on file to calculate BMI  Input and Output Summary (last 24 hours): Intake/Output Summary (Last 24 hours) at 2022 0950  Last data filed at 2022 0536  Gross per 24 hour   Intake 1450 ml   Output 675 ml   Net 775 ml       Physical Exam:   Physical Exam     Constitutional:       Appearance: Normal appearance  HENT:      Head: Normocephalic and atraumatic  Eyes:      Extraocular Movements: Extraocular movements intact  Pupils: Pupils are equal, round, and reactive to light  Cardiovascular:      Rate and Rhythm: Normal rate and regular rhythm  Heart sounds: No murmur heard  Pulmonary:      Effort: Pulmonary effort is normal       Breath sounds: Normal breath sounds  Abdominal:      General: Abdomen is flat  There is no distension  Palpations: Abdomen is soft  There is no mass  Musculoskeletal:         General: No swelling  Normal range of motion  Skin:     General: Skin is warm and dry  Coloration: Skin is not jaundiced  Neurological:      General: No focal deficit present  Mental Status: He is alert and oriented to person, place, and time  Psychiatric:         Mood and Affect: Mood normal      Additional Data:     Labs:  Results from last 7 days   Lab Units 07/27/22  0511   WBC Thousand/uL 7 12   HEMOGLOBIN g/dL 15 5   HEMATOCRIT % 47 2   PLATELETS Thousands/uL 263   NEUTROS PCT % 83*   LYMPHS PCT % 13*   MONOS PCT % 3*   EOS PCT % 1     Results from last 7 days   Lab Units 07/27/22  0511   SODIUM mmol/L 140   POTASSIUM mmol/L 4 0   CHLORIDE mmol/L 110*   CO2 mmol/L 26   BUN mg/dL 21   CREATININE mg/dL 1 16   ANION GAP mmol/L 4   CALCIUM mg/dL 9 9   ALBUMIN g/dL 3 3*   TOTAL BILIRUBIN mg/dL 0 63   ALK PHOS U/L 81   ALT U/L 49   AST U/L 49*   GLUCOSE RANDOM mg/dL 128     Results from last 7 days   Lab Units 07/27/22  0511   INR  1 01     Results from last 7 days   Lab Units 07/27/22  1645   POC GLUCOSE mg/dl 98         Results from last 7 days   Lab Units 07/28/22  0536 07/27/22  0511   LACTIC ACID mmol/L  --  1 4   PROCALCITONIN ng/ml 40 94* 0 34*       Lines/Drains:  Invasive Devices  Report    Peripheral Intravenous Line  Duration           Peripheral IV 07/27/22 Left Antecubital 1 day                      Imaging: No pertinent imaging reviewed  Recent Cultures (last 7 days):   Results from last 7 days   Lab Units 07/27/22  0511   BLOOD CULTURE  No Growth at 24 hrs     GRAM STAIN RESULT  Gram negative rods*       Last 24 Hours Medication List: Current Facility-Administered Medications   Medication Dose Route Frequency Provider Last Rate    acetaminophen  650 mg Oral Q4H PRN Tosin Nation, DO      cefepime  1,000 mg Intravenous Q12H Hetul Ashraf, DO 1,000 mg (07/28/22 0603)    enoxaparin  40 mg Subcutaneous Daily Hetul Ashraf, DO      metroNIDAZOLE  500 mg Oral Q8H [facility] Hetul Ashraf, DO      ondansetron  4 mg Intravenous Q4H PRN Tosin Nation, DO      oxybutynin  5 mg Oral Daily Hetul Ashraf, DO      pantoprazole  20 mg Oral Early Morning Hetul Ashraf, DO      sodium chloride (PF)  3 mL Intravenous Q1H PRN Manav Aviles MD      sodium chloride  75 mL/hr Intravenous Continuous Hetul Ashraf, DO 75 mL/hr (07/27/22 1225)        Today, Patient Was Seen By: Hawa Dill MD    **Please Note: This note may have been constructed using a voice recognition system  **Primary Care Provider: Jossy Young MD   Date and time admitted to hospital: 7/27/2022  4:44 AM    Aspiration pneumonia [facility])  Assessment & Plan  · CXR with No acute cardiopulmonary disease  · CT abd/pelvis show bibasilar consolidation, concerning for pneumonia  Correlate for the possibility of aspiration given the distribution  L>R perinephric fat stranding, nonspecific, but new since 2015  Correlate with urinalysis and evaluate clinically for pyelonephritis  CT Chest show patchy alveolar consolidation in both lower lobes suspicious for pneumonia, possibly aspiration  Fusiform ectasia of ascending thoracic aorta measuring 43 mm  Recommendation is for follow-up low radiation dose noncontrast chest CT in one year  Incidental findings including stable hepatic hypodensities, bilateral punctate intrarenal calculi, colonic diverticulosis and small hiatal hernia  · On Cefepime and flagyl which will be continued  Saturating at 97% on oxygen supplementation  Wean as tolerable  · Procalcitonin of 40 from 3   Trend and exercise aspiration prec      Urinary tract infection without hematuria  Assessment & Plan  --UA positive  --CT abdomen as mentioned above  --Urine culture is pending  --Continue iov Cefepime while awaiting culture result       * Febrile illness  Assessment & Plan  · Most likely 2/2 to  Bilateral PNA possible aspiration in addition to UTI  · 1/2 prelim blood cx result with GNR  On Cefepime but most likely a contaminant   He is on iv broad spectrem abx  · Monitor and give prn antipyretics              Prostate cancer [facility])  Assessment & Plan  Outpatient follow-up

## 2022-07-28 NOTE — ASSESSMENT & PLAN NOTE
· CXR with No acute cardiopulmonary disease  · CT abd/pelvis show bibasilar consolidation, concerning for pneumonia  Correlate for the possibility of aspiration given the distribution  L>R perinephric fat stranding, nonspecific, but new since 2015  Correlate with urinalysis and evaluate clinically for pyelonephritis  CT Chest show patchy alveolar consolidation in both lower lobes suspicious for pneumonia, possibly aspiration  Fusiform ectasia of ascending thoracic aorta measuring 43 mm  Recommendation is for follow-up low radiation dose noncontrast chest CT in one year  Incidental findings including stable hepatic hypodensities, bilateral punctate intrarenal calculi, colonic diverticulosis and small hiatal hernia  · On Cefepime and flagyl which will be continued  Saturating at 97% on oxygen supplementation  Wean as tolerable  · Procalcitonin of 40 from 3   Trend and exercise aspiration prec
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
## 2022-07-28 NOTE — ASSESSMENT & PLAN NOTE
· Most likely 2/2 to  Bilateral PNA possible aspiration in addition to UTI  · 1/2 prelim blood cx result with GNR  On Cefepime but most likely a contaminant   He is on iv broad spectrem abx  · Monitor and give prn antipyretics

## 2022-07-29 VITALS
OXYGEN SATURATION: 96 % | DIASTOLIC BLOOD PRESSURE: 91 MMHG | HEART RATE: 58 BPM | RESPIRATION RATE: 15 BRPM | SYSTOLIC BLOOD PRESSURE: 167 MMHG | TEMPERATURE: 97.8 F

## 2022-07-29 PROBLEM — R78.81 BACTEREMIA: Status: ACTIVE | Noted: 2022-07-29

## 2022-07-29 LAB
ALBUMIN SERPL BCP-MCNC: 2.6 G/DL (ref 3.5–5)
ALP SERPL-CCNC: 84 U/L (ref 46–116)
ALT SERPL W P-5'-P-CCNC: 58 U/L (ref 12–78)
ANION GAP SERPL CALCULATED.3IONS-SCNC: 4 MMOL/L (ref 4–13)
AST SERPL W P-5'-P-CCNC: 37 U/L (ref 5–45)
BACTERIA BLD CULT: ABNORMAL
BACTERIA UR CULT: ABNORMAL
BASOPHILS # BLD AUTO: 0.05 THOUSANDS/ΜL (ref 0–0.1)
BASOPHILS NFR BLD AUTO: 1 % (ref 0–1)
BILIRUB SERPL-MCNC: 0.29 MG/DL (ref 0.2–1)
BUN SERPL-MCNC: 14 MG/DL (ref 5–25)
CALCIUM ALBUM COR SERPL-MCNC: 10.5 MG/DL (ref 8.3–10.1)
CALCIUM SERPL-MCNC: 9.4 MG/DL (ref 8.3–10.1)
CHLORIDE SERPL-SCNC: 111 MMOL/L (ref 96–108)
CO2 SERPL-SCNC: 24 MMOL/L (ref 21–32)
CREAT SERPL-MCNC: 0.83 MG/DL (ref 0.6–1.3)
EOSINOPHIL # BLD AUTO: 0.14 THOUSAND/ΜL (ref 0–0.61)
EOSINOPHIL NFR BLD AUTO: 2 % (ref 0–6)
ERYTHROCYTE [DISTWIDTH] IN BLOOD BY AUTOMATED COUNT: 12.5 % (ref 11.6–15.1)
GFR SERPL CREATININE-BSD FRML MDRD: 84 ML/MIN/1.73SQ M
GLUCOSE SERPL-MCNC: 92 MG/DL (ref 65–140)
GRAM STN SPEC: ABNORMAL
HCT VFR BLD AUTO: 41.3 % (ref 36.5–49.3)
HGB BLD-MCNC: 13.8 G/DL (ref 12–17)
IMM GRANULOCYTES # BLD AUTO: 0.03 THOUSAND/UL (ref 0–0.2)
IMM GRANULOCYTES NFR BLD AUTO: 0 % (ref 0–2)
KLEBSIELLA SP DNA BLD POS QL NAA+NON-PRB: DETECTED
LYMPHOCYTES # BLD AUTO: 1.33 THOUSANDS/ΜL (ref 0.6–4.47)
LYMPHOCYTES NFR BLD AUTO: 18 % (ref 14–44)
MCH RBC QN AUTO: 31.1 PG (ref 26.8–34.3)
MCHC RBC AUTO-ENTMCNC: 33.4 G/DL (ref 31.4–37.4)
MCV RBC AUTO: 93 FL (ref 82–98)
MONOCYTES # BLD AUTO: 1.05 THOUSAND/ΜL (ref 0.17–1.22)
MONOCYTES NFR BLD AUTO: 14 % (ref 4–12)
NEUTROPHILS # BLD AUTO: 4.67 THOUSANDS/ΜL (ref 1.85–7.62)
NEUTS SEG NFR BLD AUTO: 65 % (ref 43–75)
NRBC BLD AUTO-RTO: 0 /100 WBCS
PLATELET # BLD AUTO: 209 THOUSANDS/UL (ref 149–390)
PMV BLD AUTO: 10.5 FL (ref 8.9–12.7)
POTASSIUM SERPL-SCNC: 3.8 MMOL/L (ref 3.5–5.3)
PROCALCITONIN SERPL-MCNC: 24.13 NG/ML
PROT SERPL-MCNC: 6 G/DL (ref 6.4–8.4)
RBC # BLD AUTO: 4.44 MILLION/UL (ref 3.88–5.62)
SODIUM SERPL-SCNC: 139 MMOL/L (ref 135–147)
WBC # BLD AUTO: 7.27 THOUSAND/UL (ref 4.31–10.16)

## 2022-07-29 PROCEDURE — 85025 COMPLETE CBC W/AUTO DIFF WBC: CPT | Performed by: HOSPITALIST

## 2022-07-29 PROCEDURE — 99239 HOSP IP/OBS DSCHRG MGMT >30: CPT | Performed by: HOSPITALIST

## 2022-07-29 PROCEDURE — 84145 PROCALCITONIN (PCT): CPT | Performed by: HOSPITALIST

## 2022-07-29 PROCEDURE — 92526 ORAL FUNCTION THERAPY: CPT

## 2022-07-29 PROCEDURE — 97530 THERAPEUTIC ACTIVITIES: CPT

## 2022-07-29 PROCEDURE — 97116 GAIT TRAINING THERAPY: CPT

## 2022-07-29 PROCEDURE — 80053 COMPREHEN METABOLIC PANEL: CPT | Performed by: HOSPITALIST

## 2022-07-29 RX ORDER — LOSARTAN POTASSIUM 50 MG/1
50 TABLET ORAL
Status: DISCONTINUED | OUTPATIENT
Start: 2022-07-29 | End: 2022-07-29 | Stop reason: HOSPADM

## 2022-07-29 RX ORDER — CIPROFLOXACIN 500 MG/1
500 TABLET, FILM COATED ORAL EVERY 12 HOURS SCHEDULED
Qty: 20 TABLET | Refills: 0 | Status: SHIPPED | OUTPATIENT
Start: 2022-07-29 | End: 2022-08-08

## 2022-07-29 RX ADMIN — ACETAMINOPHEN 650 MG: 325 TABLET ORAL at 10:06

## 2022-07-29 RX ADMIN — PANTOPRAZOLE SODIUM 20 MG: 20 TABLET, DELAYED RELEASE ORAL at 05:13

## 2022-07-29 RX ADMIN — METRONIDAZOLE 500 MG: 500 TABLET ORAL at 13:09

## 2022-07-29 RX ADMIN — METRONIDAZOLE 500 MG: 500 TABLET ORAL at 05:13

## 2022-07-29 RX ADMIN — ENOXAPARIN SODIUM 40 MG: 40 INJECTION SUBCUTANEOUS at 08:17

## 2022-07-29 RX ADMIN — CEFEPIME HYDROCHLORIDE 1000 MG: 1 INJECTION, POWDER, FOR SOLUTION INTRAMUSCULAR; INTRAVENOUS at 05:13

## 2022-07-29 RX ADMIN — SODIUM CHLORIDE 75 ML/HR: 0.9 INJECTION, SOLUTION INTRAVENOUS at 01:37

## 2022-07-29 RX ADMIN — ACETAMINOPHEN 650 MG: 325 TABLET ORAL at 01:12

## 2022-07-29 RX ADMIN — OXYBUTYNIN 5 MG: 5 TABLET, FILM COATED, EXTENDED RELEASE ORAL at 08:17

## 2022-07-29 NOTE — PLAN OF CARE
Problem: INFECTION - ADULT  Goal: Absence or prevention of progression during hospitalization  Description: INTERVENTIONS:  - Assess and monitor for signs and symptoms of infection  - Monitor lab/diagnostic results  - Monitor all insertion sites, i e  indwelling lines, tubes, and drains  - Monitor endotracheal if appropriate and nasal secretions for changes in amount and color  - [place] appropriate cooling/warming therapies per order  - Administer medications as ordered  - Instruct and encourage patient and family to use good hand hygiene technique  - Identify and instruct in appropriate isolation precautions for identified infection/condition  Outcome: Progressing  Goal: Absence of fever/infection during neutropenic period  Description: INTERVENTIONS:  - Monitor WBC    Outcome: Progressing

## 2022-07-29 NOTE — PROGRESS NOTES
[address] [facility]  Progress Note - Francisco Median 1945, 65 y o  male MRN: [id number]  Unit/Bed#: [facility] 429-01 Encounter: [id number]  Primary Care Provider: Sumi Bay MD   Date and time admitted to hospital: 7/27/2022  4:44 AM    Aspiration pneumonia [facility])  Assessment & Plan  · Procalcitonin is improving to 24 today from 40 yesterday  Will continue current abx regimen  · CXR with No acute cardiopulmonary disease  · CT abd/pelvis show bibasilar consolidation, concerning for pneumonia  Correlate for the possibility of aspiration given the distribution  L>R perinephric fat stranding, nonspecific, but new since 2015  Correlate with urinalysis and evaluate clinically for pyelonephritis  CT Chest show patchy alveolar consolidation in both lower lobes suspicious for pneumonia, possibly aspiration  Fusiform ectasia of ascending thoracic aorta measuring 43 mm  Recommendation is for follow-up low radiation dose noncontrast chest CT in one year  Incidental findings including stable hepatic hypodensities, bilateral punctate intrarenal calculi, colonic diverticulosis and small hiatal hernia  · On Cefepime and flagyl which will be continued  Saturating at 97% on oxygen supplementation  Wean as tolerable  ·       Urinary tract infection without hematuria  Assessment & Plan  --UA positive  Urine cx positive with Kleb resistant to TCN  On Cefepime which will be de-escalated to Rocephin if ok with ID consulted    --CT abdomen as mentioned above  --Will await ID rec before abx de-escalation order wise wilol change it to Rocephin       * Febrile illness  Assessment & Plan  · Most likely 2/2 to  Bilateral PNA possible aspiration in addition to UTI and probable bacteremia  · 1/2 prelim blood cx result with GNR  On Cefepime and Flagyl but most likely a contaminant  · Monitor    · Prn antipyretics              Bacteremia  Assessment & Plan  1/2 bottles positive with GNR  Final result is still pending but a repeat blood culture drawn on  is negative for 48 hrs thus far  If negative for 3 days which is tomorrow , will dc patient is ok with ID    Prostate cancer [facility])  Assessment & Plan  Outpatient follow-up          VTE Pharmacologic Prophylaxis:   Moderate Risk (Score 3-4) - Pharmacological DVT Prophylaxis Ordered: enoxaparin (Lovenox)  Patient Centered Rounds: nurse updated    Discussions with Specialists or Other Care Team Provider: MEGHAN    Education and Discussions with Family / Patient: Updated  (wife) via phone  Time Spent for Care: 30 minutes  More than 50% of total time spent on counseling and coordination of care as described above  Current Length of Stay: 2 day(s)  Current Patient Status: Inpatient   Certification Statement: The patient will continue to require additional inpatient hospital stay due to Bacteremia  Discharge Plan: Anticipate discharge in 24-48 hrs to home  DC depends on ID rec with respect to bacteremia /    Code Status: Level 1 - Full Code    Subjective:   Patient is eager to go home     Objective:     Vitals:   Temp (24hrs), Av °F (36 7 °C), Min:97 5 °F (36 4 °C), Max:98 3 °F (36 8 °C)    Temp:  [97 5 °F (36 4 °C)-98 3 °F (36 8 °C)] 98 1 °F (36 7 °C)  HR:  [55-75] 75  Resp:  [16-19] 18  BP: (130-148)/(72-91) 142/72  SpO2:  [91 %-96 %] 91 %  There is no height or weight on file to calculate BMI  Input and Output Summary (last 24 hours): Intake/Output Summary (Last 24 hours) at 2022 1140  Last data filed at 2022 0517  Gross per 24 hour   Intake 1710 ml   Output 2900 ml   Net -1190 ml       Physical Exam:   Constitutional:       Appearance: Normal appearance  HENT:      Head: Normocephalic and atraumatic  Eyes:      Extraocular Movements: Extraocular movements intact       Pupils: Pupils are equal, round, and reactive to light  Cardiovascular:      Rate and Rhythm: Normal rate and regular rhythm       Heart sounds:  No murmur heard  Pulmonary:      Effort: Pulmonary effort is normal       Breath sounds: Normal breath sounds  Abdominal:      General: Abdomen is flat  There is no distension       Palpations: Abdomen is soft  There is no mass  Musculoskeletal:         General: No swelling  Normal range of motion  Skin:     General: Skin is warm and dry       Coloration: Skin is not jaundiced  Neurological:      General: No focal deficit present       Mental Status: He is alert and oriented to person, place, and time  Psychiatric:         Mood and Affect: Mood normal    Additional Data:     Labs:  Results from last 7 days   Lab Units 07/29/22  0512   WBC Thousand/uL 7 27   HEMOGLOBIN g/dL 13 8   HEMATOCRIT % 41 3   PLATELETS Thousands/uL 209   NEUTROS PCT % 65   LYMPHS PCT % 18   MONOS PCT % 14*   EOS PCT % 2     Results from last 7 days   Lab Units 07/29/22  0512   SODIUM mmol/L 139   POTASSIUM mmol/L 3 8   CHLORIDE mmol/L 111*   CO2 mmol/L 24   BUN mg/dL 14   CREATININE mg/dL 0 83   ANION GAP mmol/L 4   CALCIUM mg/dL 9 4   ALBUMIN g/dL 2 6*   TOTAL BILIRUBIN mg/dL 0 29   ALK PHOS U/L 84   ALT U/L 58   AST U/L 37   GLUCOSE RANDOM mg/dL 92     Results from last 7 days   Lab Units 07/27/22  0511   INR  1 01     Results from last 7 days   Lab Units 07/27/22  1645   POC GLUCOSE mg/dl 98         Results from last 7 days   Lab Units 07/29/22  0512 07/28/22  0536 07/27/22  0511   LACTIC ACID mmol/L  --   --  1 4   PROCALCITONIN ng/ml 24 13* 40 94* 0 34*       Lines/Drains:  Invasive Devices  Report    Peripheral Intravenous Line  Duration           Peripheral IV 07/29/22 Right;Ventral (anterior) Forearm <1 day                      Imaging: No pertinent imaging reviewed  Recent Cultures (last 7 days):   Results from last 7 days   Lab Units 07/27/22  0528 07/27/22  0511   BLOOD CULTURE   --  No Growth at 48 hrs    Gram Negative Reggie Enteric Like*   GRAM STAIN RESULT   --  Gram negative rods*   URINE CULTURE  >100,000 cfu/ml Klebsiella pneumoniae*  --        Last 24 Hours Medication List:   Current Facility-Administered Medications   Medication Dose Route Frequency Provider Last Rate    acetaminophen  650 mg Oral Q4H PRN Watkins Deo, DO      cefepime  1,000 mg Intravenous Q12H Hetul Ashraf, DO 1,000 mg (07/29/22 0513)    enoxaparin  40 mg Subcutaneous Daily Hetul Ashraf, DO      metroNIDAZOLE  500 mg Oral Q8H [facility] Hetul Ashraf, DO      ondansetron  4 mg Intravenous Q4H PRN Watkins Deo, DO      oxybutynin  5 mg Oral Daily Hetul Ashraf, DO      pantoprazole  20 mg Oral Early Morning Hetul Ashraf, DO      sodium chloride (PF)  3 mL Intravenous Q1H PRN Moises Marvin MD      sodium chloride  75 mL/hr Intravenous Continuous Hetul Ashraf, DO 75 mL/hr (07/29/22 0137)        Today, Patient Was Seen By: Zainab Mao MD    **Please Note: This note may have been constructed using a voice recognition system  **

## 2022-07-29 NOTE — ASSESSMENT & PLAN NOTE
· Most likely 2/2 to  Bilateral PNA possible aspiration in addition to UTI and probable bacteremia  · 1/2 prelim blood cx result with GNR  On Cefepime and Flagyl but most likely a contaminant     · Will dc on oral Cipro  · Prn antipyretics

## 2022-07-29 NOTE — ASSESSMENT & PLAN NOTE
--UA positive  Urine cx positive with Kleb resistant to TCN but sensitive to Cipro     --CT abdomen as mentioned above

## 2022-07-29 NOTE — ASSESSMENT & PLAN NOTE
· Most likely 2/2 to  Bilateral PNA possible aspiration in addition to UTI and probable bacteremia  · 1/2 prelim blood cx result with GNR  On Cefepime and Flagyl but most likely a contaminant  · Monitor    · Prn antipyretics

## 2022-07-29 NOTE — ASSESSMENT & PLAN NOTE
· Procalcitonin is improving to 24 today from 40 yesterday  Will continue current abx regimen  · CXR with No acute cardiopulmonary disease  · CT abd/pelvis show bibasilar consolidation, concerning for pneumonia  Correlate for the possibility of aspiration given the distribution  L>R perinephric fat stranding, nonspecific, but new since 2015  Correlate with urinalysis and evaluate clinically for pyelonephritis  CT Chest show patchy alveolar consolidation in both lower lobes suspicious for pneumonia, possibly aspiration  Fusiform ectasia of ascending thoracic aorta measuring 43 mm  Recommendation is for follow-up low radiation dose noncontrast chest CT in one year  Incidental findings including stable hepatic hypodensities, bilateral punctate intrarenal calculi, colonic diverticulosis and small hiatal hernia  · On Cefepime and flagyl which will be Changed to oral Cipro since urine cx and blood cx results have been resulted  · ID consulted but patient said he cannot wait to see ID being that his grandkids are leaving to [facility] [place] he wants to sign AMA    · He was advised against AMA but he decised in the presence ofhis wife flora lito CHRISTINE

## 2022-07-29 NOTE — ASSESSMENT & PLAN NOTE
· Came in septic with fever of 105 degrees, tachypnea of 26, elevated procalcitonin  And bilateral PNA, UTI and bacteremia  Resolved with fever, tachypnea resolved and procalcitonin trending down   · Procalcitonin is improving to 24 today from 40 yesterday  Will continue current abx regimen  · Most likely 2/2 to  Bilateral PNA possible aspiration in addition to UTI and probable bacteremia  · 1/2 prelim blood cx result with GNR  On Cefepime and Flagyl but most likely a contaminant     · Will dc on oral Cipro  · Prn antipyretics

## 2022-07-29 NOTE — PLAN OF CARE
Problem: PHYSICAL THERAPY ADULT  Goal: Performs mobility at highest level of function for planned discharge setting  See evaluation for individualized goals  Description: Treatment/Interventions: Functional transfer training, LE strengthening/ROM, Elevations, Therapeutic exercise, Bed mobility, Gait training, Spoke to nursing, OT, Spoke to case management          See flowsheet documentation for full assessment, interventions and recommendations  Outcome: Adequate for Discharge  Note: Prognosis: Good  Problem List: Decreased strength, Decreased endurance, Impaired balance, Decreased mobility  Assessment: Pt demonstrated overall improvement in balance, endurance and all aspects of observed mobility progressing to mod (I) level on level surfaces (incl amb w/o AD) and on the steps; no overt uncorrected LOB, gross knee buckling, or swaying observed during the session; no increased discomfort or excessive fatigue expressed; LE therex/HEP reviewed and performed; pt appeared to be comfortable at the end of session; overall, cont to anticipate pt will return home w/ available family support upon D/C from PT/mobility stand point and when medically cleared; no other immediate PT needs otherwise identified while in the hospital; pt informed and concurred; pt expressed no concerns about going home from mobility stand point, when medically cleared; will therefore sign off  Barriers to Discharge: None     PT Discharge Recommendation: No rehabilitation needs    See flowsheet documentation for full assessment

## 2022-07-29 NOTE — ASSESSMENT & PLAN NOTE
--UA positive  Urine cx positive with Kleb resistant to TCN   On Cefepime which will be de-escalated to Rocephin if ok with ID consulted    --CT abdomen as mentioned above  --Will await ID rec before abx de-escalation order wise wilol change it to Rocephin
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
## 2022-07-29 NOTE — ASSESSMENT & PLAN NOTE
1/2 bottles positive with GNR  Final result is still pending but a repeat blood culture drawn on 7/27 is negative for 48 hrs thus far  ID consulted but patient could not wait for ID consult and signed again medical advice  Will dc him on 10 days of oral Cipro 500 mg po BID

## 2022-07-29 NOTE — PHYSICAL THERAPY NOTE
PHYSICAL THERAPY NOTE          Patient Name: Jasmin Bustillos  UCJWH'[id number] Date: 7/29/2022 07/29/22 7831   PT Last Visit   PT Visit Date 07/29/22   Note Type   Note Type Treatment   Pain Assessment   Pain Assessment Tool 0-10   Pain Score No Pain   Restrictions/Precautions   Other Precautions Multiple lines   General   Chart Reviewed Yes   Additional Pertinent History cleared for Tx session (spoke to giselle)   Response to Previous Treatment Patient with no complaints from previous session  Cognition   Overall Cognitive Status WFL   Arousal/Participation Alert; Cooperative   Attention Within functional limits   Orientation Level Oriented to person;Oriented to place;Oriented to situation   Memory Within functional limits   Following Commands Follows all commands and directions without difficulty   Subjective   Subjective Alert; in the chair; agreeable to amb and try steps;   Transfers   Sit to Stand 6  Modified independent   Stand to Sit 6  Modified independent   Ambulation/Elevation   Gait pattern Short stride  (no overt LOB, knee buckling or swaying)   Gait Assistance 6  Modified independent   Assistive Device None   Distance 120 ft + 2 x 15 ft + 120 ft w/ standing rest periods and steps negotiation in between;   Stair Management Assistance 6  Modified independent   Stair Management Technique One rail R;Step to pattern; Foreward;Backward;Nonreciprocal   Number of Stairs 8  (2 steps x 4 trials)   Balance   Static Sitting Normal   Dynamic Sitting Good   Static Standing Fair +   Dynamic Standing Fair +   Ambulatory Fair   Activity Tolerance   Activity Tolerance Patient tolerated treatment well   Nurse Made Aware spoke to [place]   Exercises   Hip Abduction Sitting;15 reps;AAROM; Bilateral   Knee AROM Long Arc Quad Sitting;15 reps;AROM; Bilateral  (HEP)   Ankle Pumps Sitting;15 reps;AROM; Bilateral  (HEP)   Marching Sitting;10 reps;AROM; Bilateral  (HEP)   Assessment   Assessment Pt demonstrated overall improvement in balance, endurance and all aspects of observed mobility progressing to mod (I) level on level surfaces (incl amb w/o AD) and on the steps; no overt uncorrected LOB, gross knee buckling, or swaying observed during the session; no increased discomfort or excessive fatigue expressed; LE therex/HEP reviewed and performed; pt appeared to be comfortable at the end of session; overall, cont to anticipate pt will return home w/ available family support upon D/C from PT/mobility stand point and when medically cleared; no other immediate PT needs otherwise identified while in the hospital; pt informed and concurred; pt expressed no concerns about going home from mobility stand point, when medically cleared; will therefore sign off   Barriers to Discharge None   Goals   Patient Goals to return home   PT Treatment Day 1   Plan   Treatment/Interventions Spoke to nursing   Progress Discontinue PT   Recommendation   PT Discharge Recommendation No rehabilitation needs   AM-PAC Basic Mobility Inpatient   Turning in Bed Without Bedrails 4   Lying on Back to Sitting on Edge of Flat Bed 4   Moving Bed to Chair 4   Standing Up From Chair 4   Walk in Room 4   Climb 3-5 Stairs 4   Basic Mobility Inpatient Raw Score 24   Basic Mobility Standardized Score 57 68   Highest Level Of Mobility   JH-HLM Goal 8: Walk 250 feet or more   JH-HLM Achieved 8: Walk 250 feet ot more   Education   Education Provided Mobility training;Home exercise program   Patient Demonstrates verbal understanding   End of Consult   Patient Position at End of Consult Bedside chair; All needs within reach     [facility], PT

## 2022-07-29 NOTE — ASSESSMENT & PLAN NOTE
· Procalcitonin is improving to 24 today from 40 yesterday  Will continue current abx regimen  · CXR with No acute cardiopulmonary disease  · CT abd/pelvis show bibasilar consolidation, concerning for pneumonia  Correlate for the possibility of aspiration given the distribution  L>R perinephric fat stranding, nonspecific, but new since 2015  Correlate with urinalysis and evaluate clinically for pyelonephritis  CT Chest show patchy alveolar consolidation in both lower lobes suspicious for pneumonia, possibly aspiration  Fusiform ectasia of ascending thoracic aorta measuring 43 mm  Recommendation is for follow-up low radiation dose noncontrast chest CT in one year  Incidental findings including stable hepatic hypodensities, bilateral punctate intrarenal calculi, colonic diverticulosis and small hiatal hernia  · On Cefepime and flagyl which will be continued  Saturating at 97% on oxygen supplementation   Wean as tolerable  ·

## 2022-07-29 NOTE — PROGRESS NOTES
ID consulted for clearance to home  Consult placed  Walk into patients room and patient and wife wanted ID to see patient sooner rather than later so he could be discharged  I told them I would reach out to ID  I spoke to Dr Pat Walker via [place] [place] Text and he told me Dr Jevon Simms would see him later  Spoke to the patient and his wife and told them I didn't know what time the ID doctor would be here, but it would be later  Patient's wife demanded that I call the internal medicine doctor now to get ID down here  I stated that it wasn't an emergency and I would not call her, but I could tiger text her  The patient's wife asked for the doctor's phone number so the wife could call her and I said I would not give it to her because I call her via a secure line  I also stated that it was inappropriate for the wife to call the doctor  I then tiger texted the [facility] doctor and asked the doctor to call the wife and gave the doctor her cell phone number  The decision was made between the doctor and the patient that he would leave [place]

## 2022-07-29 NOTE — ASSESSMENT & PLAN NOTE
· Came in septic with fever of 105 degrees, tachypnea of 26, elevated procalcitonin  And bilateral PNA, UTI and bacteremia  Resolved with fever, tachypnea resolved and procalcitonin trending down   · Procalcitonin is improving to 24 today from 40 yesterday  Will continue current abx regimen  · CXR with No acute cardiopulmonary disease  · CT abd/pelvis show bibasilar consolidation, concerning for pneumonia  Correlate for the possibility of aspiration given the distribution  L>R perinephric fat stranding, nonspecific, but new since 2015  Correlate with urinalysis and evaluate clinically for pyelonephritis  CT Chest show patchy alveolar consolidation in both lower lobes suspicious for pneumonia, possibly aspiration  Fusiform ectasia of ascending thoracic aorta measuring 43 mm  Recommendation is for follow-up low radiation dose noncontrast chest CT in one year  Incidental findings including stable hepatic hypodensities, bilateral punctate intrarenal calculi, colonic diverticulosis and small hiatal hernia  · On Cefepime and flagyl which will be Changed to oral Cipro since urine cx and blood cx results have been resulted  · ID consulted but patient said he cannot wait to see ID being that his grandkids are leaving to [facility] in [place] he wants to sign AMA    · He was advised against AMA but he decised in the presence ofhis wife flora lito CHRISTINE

## 2022-07-29 NOTE — DISCHARGE SUMMARY
[address] [facility]  Progress Note - Verdene Brain 1945, 65 y o  male MRN: [id number]  Unit/Bed#: [facility] 429-01 Encounter: [id number]  Primary Care Provider: Jovanni Wang MD   Date and time admitted to hospital: 7/27/2022  4:44 AM    Aspiration pneumonia [facility])  Assessment & Plan  · Came in septic with fever of 105 degrees, tachypnea of 26, elevated procalcitonin  And bilateral PNA, UTI and bacteremia  Resolved with fever, tachypnea resolved and procalcitonin trending down   · Procalcitonin is improving to 24 today from 40 yesterday  Will continue current abx regimen  · CXR with No acute cardiopulmonary disease  · CT abd/pelvis show bibasilar consolidation, concerning for pneumonia  Correlate for the possibility of aspiration given the distribution  L>R perinephric fat stranding, nonspecific, but new since 2015  Correlate with urinalysis and evaluate clinically for pyelonephritis  CT Chest show patchy alveolar consolidation in both lower lobes suspicious for pneumonia, possibly aspiration  Fusiform ectasia of ascending thoracic aorta measuring 43 mm  Recommendation is for follow-up low radiation dose noncontrast chest CT in one year  Incidental findings including stable hepatic hypodensities, bilateral punctate intrarenal calculi, colonic diverticulosis and small hiatal hernia  · On Cefepime and flagyl which will be Changed to oral Cipro since urine cx and blood cx results have been resulted  · ID consulted but patient said he cannot wait to see ID being that his grandkids are leaving to [facility] in [place] he wants to sign AMA  · He was advised against AMA but he decised in the presence ofhis wife flora leave CHRISTINE      Urinary tract infection without hematuria  Assessment & Plan  --UA positive  Urine cx positive with Kleb resistant to TCN but sensitive to Cipro     --CT abdomen as mentioned above        * Febrile illness  Assessment & Plan  · Came in septic with fever of 105 degrees, tachypnea of 26, elevated procalcitonin  And bilateral PNA, UTI and bacteremia  Resolved with fever, tachypnea resolved and procalcitonin trending down   · Procalcitonin is improving to 24 today from 40 yesterday  Will continue current abx regimen  · Most likely 2/2 to  Bilateral PNA possible aspiration in addition to UTI and probable bacteremia  · 1/2 prelim blood cx result with GNR  On Cefepime and Flagyl but most likely a contaminant  · Will dc on oral Cipro  · Prn antipyretics              Bacteremia  Assessment & Plan  1/2 bottles positive with GNR  Final result is still pending but a repeat blood culture drawn on 7/27 is negative for 48 hrs thus far  ID consulted but patient could not wait for ID consult and signed again medical advice  Will dc him on 10 days of oral Cipro 500 mg po BID    Prostate cancer [facility])  Assessment & Plan  Outpatient follow-up      Medical Problems             Resolved Problems  Date Reviewed: 7/29/2022   None               Discharging Physician / Practitioner: Noble Boateng MD  PCP: Irma Ortiz MD  Admission Date:   Admission Orders (From admission, onward)     Ordered        07/27/22 0909  INPATIENT ADMISSION  Once                      Discharge Date: 07/29/22    Consultations During Hospital Stay:  · ID      Procedures Performed:   · None    Significant Findings / Test Results:   · Blood culture  · UA/Urine culture    Incidental Findings:   · 1/2 blood culture positive bacteremia     Test Results Pending at Discharge (will require follow up): · Blood culture repeat final result      Outpatient Tests Requested:  Bklood culture repeat by his pcp    Complications:  None    Reason for Admission: PNA    Hospital Course:   65 y o  male who presents with febrile illness  Patient presents with symptoms of chills/rigors and fever since last night  He presented with severe diaphoresis  Patient also noted have some transient abdominal discomfort    He has past medical history of prostate cancer and presents with symptoms of shortness of breath and fever  Per EMS prior to arrival his oxygen saturation was 88% so he is placed on 4 L nasal cannula with improved oxygen saturation to 92%  His abdominal pain has since resolved but his shortness of breath and nausea have not  He was found to have bilateral PNA suspected to be aspirational  He also had UTI and had 1/2 bottle of his blood culture positive with GNR  He was managed with Cefepime and Flagyl for 2 days while awaiting the urine culture and final blood culture results  His urine culture and the 1/2 positive blood culture were resulted as Klba Pneumoniae sensitive to Ciprofloxacin and Augmentin  ID was consulted for abx stewardship since a repeat blood culture result have been negative for 48 hours  Before ID could see patient, the wife came and patient said they want to leave [facility] since their grand children will be flying back to [place] in am  He was advised against leaving but he insisted  It was made clear that if anything is to happen to him after he leave [facility], the hospital and it's staff will not be responsible for him and he agreed  He signed the AMA paper and left before I had to opportunity to see him again although I spoke with him and his wife extensively over the phone  He was prescribed a 10 days course of oral Cipro to [facility]  Condition at Discharge: fair    Discharge Day Visit / Exam:   Subjective:  No complaint rather than wanting to go home now  Vitals: Blood Pressure: 167/91 (07/29/22 1506)  Pulse: 58 (07/29/22 1506)  Temperature: 97 8 °F (36 6 °C) (07/29/22 1506)  Temp Source: Oral (07/28/22 2302)  Respirations: 15 (07/29/22 1506)  SpO2: 96 % (07/29/22 1506)  Exam:   Constitutional:       Appearance: Normal appearance  HENT:      Head: Normocephalic and atraumatic  Eyes:      Extraocular Movements: Extraocular movements intact  Pupils: Pupils are equal, round, and reactive to light  Cardiovascular:      Rate and Rhythm: Normal rate and regular rhythm  Heart sounds: No murmur heard  Pulmonary:      Effort: Pulmonary effort is normal       Breath sounds: Normal breath sounds  Abdominal:      General: Abdomen is flat  There is no distension  Palpations: Abdomen is soft  There is no mass  Musculoskeletal:         General: No swelling  Normal range of motion  Skin:     General: Skin is warm and dry  Coloration: Skin is not jaundiced  Neurological:      General: No focal deficit present  Mental Status: He is alert and oriented to person, place, and time  Psychiatric:         Mood and Affect: Mood normal  ·        Discussion with Family: Updated  (wife) at bedside  Discharge instructions/Information to patient and family:   See after visit summary for information provided to patient and family  Provisions for Follow-Up Care:  See after visit summary for information related to follow-up care and any pertinent home health orders  Disposition:   Home    Planned Readmission: no     Discharge Statement:  I spent >35 minutes discharging the patient  This time was spent on the day of discharge  I had direct contact with the patient on the day of discharge  Greater than 50% of the total time was spent examining patient, answering all patient questions, arranging and discussing plan of care with patient as well as directly providing post-discharge instructions  Additional time then spent on discharge activities  Discharge Medications:  See after visit summary for reconciled discharge medications provided to patient and/or family        **Please Note: This note may have been constructed using a voice recognition system**

## 2022-07-29 NOTE — ASSESSMENT & PLAN NOTE
1/2 bottles positive with GNR  Final result is still pending but a repeat blood culture drawn on 7/27 is negative for 48 hrs thus far  If negative for 3 days which is tomorrow 7/30, will dc patient is ok with ID

## 2022-08-01 LAB — BACTERIA BLD CULT: NORMAL

## 2022-08-16 ENCOUNTER — LAB REQUISITION (OUTPATIENT)
Dept: LAB | Facility: HOSPITAL | Age: 77
End: 2022-08-16
Payer: MEDICARE

## 2022-08-16 DIAGNOSIS — N39.0 URINARY TRACT INFECTION, SITE NOT SPECIFIED: ICD-10-CM

## 2022-08-16 LAB
BILIRUB UR QL STRIP: NEGATIVE
CLARITY UR: CLEAR
COLOR UR: YELLOW
GLUCOSE UR STRIP-MCNC: NEGATIVE MG/DL
HGB UR QL STRIP.AUTO: NEGATIVE
KETONES UR STRIP-MCNC: NEGATIVE MG/DL
LEUKOCYTE ESTERASE UR QL STRIP: NEGATIVE
NITRITE UR QL STRIP: NEGATIVE
PH UR STRIP.AUTO: 7.5 [PH]
PROT UR STRIP-MCNC: NEGATIVE MG/DL
SP GR UR STRIP.AUTO: 1.01 (ref 1–1.03)
UROBILINOGEN UR STRIP-ACNC: <2 MG/DL

## 2022-08-16 PROCEDURE — 87086 URINE CULTURE/COLONY COUNT: CPT | Performed by: INTERNAL MEDICINE

## 2022-08-16 PROCEDURE — 81003 URINALYSIS AUTO W/O SCOPE: CPT | Performed by: INTERNAL MEDICINE

## 2022-08-17 LAB — BACTERIA UR CULT: NORMAL

## 2022-08-19 ENCOUNTER — HOSPITAL ENCOUNTER (OUTPATIENT)
Dept: RADIOLOGY | Age: 77
Discharge: HOME | End: 2022-08-19
Payer: MEDICARE

## 2022-08-19 DIAGNOSIS — N39.0 URINARY TRACT INFECTION WITHOUT HEMATURIA, SITE UNSPECIFIED: ICD-10-CM

## 2022-08-19 PROCEDURE — 76770 US EXAM ABDO BACK WALL COMP: CPT

## 2022-08-24 ENCOUNTER — TELEPHONE (OUTPATIENT)
Dept: UROLOGY | Facility: CLINIC | Age: 77
End: 2022-08-24
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
## 2022-08-24 NOTE — TELEPHONE ENCOUNTER
Please request ultrasound read from radiology  Patient is scheduled to see me tomorrow at 11:00 a m   For hospital follow-up    Thank you

## 2022-08-25 ENCOUNTER — OFFICE VISIT (OUTPATIENT)
Dept: UROLOGY | Facility: CLINIC | Age: 77
End: 2022-08-25
Payer: MEDICARE

## 2022-08-25 ENCOUNTER — APPOINTMENT (OUTPATIENT)
Dept: RADIOLOGY | Age: 77
End: 2022-08-25
Payer: MEDICARE

## 2022-08-25 VITALS
BODY MASS INDEX: 25.47 KG/M2 | OXYGEN SATURATION: 94 % | SYSTOLIC BLOOD PRESSURE: 122 MMHG | HEART RATE: 78 BPM | DIASTOLIC BLOOD PRESSURE: 78 MMHG | WEIGHT: 188 LBS | HEIGHT: 72 IN

## 2022-08-25 DIAGNOSIS — N39.3 STRESS INCONTINENCE: ICD-10-CM

## 2022-08-25 DIAGNOSIS — J18.9 PNEUMONIA DUE TO INFECTIOUS ORGANISM, UNSPECIFIED LATERALITY, UNSPECIFIED PART OF LUNG: ICD-10-CM

## 2022-08-25 DIAGNOSIS — C61 PROSTATE CANCER (HCC): Primary | ICD-10-CM

## 2022-08-25 DIAGNOSIS — N39.0 URINARY TRACT INFECTION WITHOUT HEMATURIA, SITE UNSPECIFIED: ICD-10-CM

## 2022-08-25 PROCEDURE — 99214 OFFICE O/P EST MOD 30 MIN: CPT | Performed by: UROLOGY

## 2022-08-25 PROCEDURE — 71046 X-RAY EXAM CHEST 2 VIEWS: CPT

## 2022-10-11 PROBLEM — J69.0 ASPIRATION PNEUMONIA (HCC): Status: RESOLVED | Noted: 2022-07-28 | Resolved: 2022-10-11

## 2022-10-11 PROBLEM — N39.0 URINARY TRACT INFECTION WITHOUT HEMATURIA: Status: RESOLVED | Noted: 2022-07-28 | Resolved: 2022-10-11

## 2022-12-06 ENCOUNTER — ESTABLISHED COMPREHENSIVE EXAM (OUTPATIENT)
Dept: URBAN - METROPOLITAN AREA CLINIC 6 | Facility: CLINIC | Age: 77
End: 2022-12-06

## 2022-12-06 DIAGNOSIS — H43.811: ICD-10-CM

## 2022-12-06 DIAGNOSIS — H25.813: ICD-10-CM

## 2022-12-06 PROCEDURE — 92014 COMPRE OPH EXAM EST PT 1/>: CPT

## 2022-12-06 ASSESSMENT — VISUAL ACUITY
OS_GLARE: 20/60
OS_CC: 20/40
OD_CC: 20/40
OU_CC: J2
OD_GLARE: 20/60

## 2022-12-06 ASSESSMENT — TONOMETRY
OD_IOP_MMHG: 12
OS_IOP_MMHG: 11

## 2022-12-16 ENCOUNTER — APPOINTMENT (OUTPATIENT)
Dept: LAB | Facility: HOSPITAL | Age: 77
End: 2022-12-16

## 2023-01-03 ENCOUNTER — OFFICE VISIT (OUTPATIENT)
Dept: UROLOGY | Facility: AMBULATORY SURGERY CENTER | Age: 78
End: 2023-01-03

## 2023-01-03 VITALS
BODY MASS INDEX: 25.47 KG/M2 | WEIGHT: 188 LBS | HEART RATE: 70 BPM | HEIGHT: 72 IN | DIASTOLIC BLOOD PRESSURE: 88 MMHG | SYSTOLIC BLOOD PRESSURE: 140 MMHG | RESPIRATION RATE: 18 BRPM | OXYGEN SATURATION: 96 %

## 2023-01-03 DIAGNOSIS — D17.9 ANGIOMYOLIPOMA: ICD-10-CM

## 2023-01-03 DIAGNOSIS — C61 PROSTATE CANCER (HCC): Primary | ICD-10-CM

## 2023-01-03 RX ORDER — FLUOROURACIL 50 MG/G
CREAM TOPICAL
COMMUNITY
Start: 2022-10-05

## 2023-01-03 RX ORDER — DOXYCYCLINE HYCLATE 100 MG/1
CAPSULE ORAL
COMMUNITY
Start: 2022-11-02
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
## 2023-01-17 ENCOUNTER — APPOINTMENT (OUTPATIENT)
Dept: RADIOLOGY | Age: 78
End: 2023-01-17

## 2023-01-17 DIAGNOSIS — M25.559 HIP PAIN: ICD-10-CM

## 2023-01-17 DIAGNOSIS — C61 PROSTATE CA (HCC): ICD-10-CM

## 2023-02-16 ENCOUNTER — OFFICE VISIT (OUTPATIENT)
Dept: OBGYN CLINIC | Facility: HOSPITAL | Age: 78
End: 2023-02-16

## 2023-02-16 VITALS — BODY MASS INDEX: 26.82 KG/M2 | WEIGHT: 198 LBS | HEIGHT: 72 IN

## 2023-02-16 DIAGNOSIS — M70.62 TROCHANTERIC BURSITIS OF LEFT HIP: Primary | ICD-10-CM

## 2023-02-16 DIAGNOSIS — M48.061 DEGENERATIVE LUMBAR SPINAL STENOSIS: ICD-10-CM

## 2023-02-16 DIAGNOSIS — M46.1 INFLAMMATION OF LEFT SACROILIAC JOINT (HCC): ICD-10-CM

## 2023-02-16 RX ORDER — BUPIVACAINE HYDROCHLORIDE 2.5 MG/ML
2 INJECTION, SOLUTION INFILTRATION; PERINEURAL
Status: COMPLETED | OUTPATIENT
Start: 2023-02-16 | End: 2023-02-16

## 2023-02-16 RX ORDER — BETAMETHASONE SODIUM PHOSPHATE AND BETAMETHASONE ACETATE 3; 3 MG/ML; MG/ML
12 INJECTION, SUSPENSION INTRA-ARTICULAR; INTRALESIONAL; INTRAMUSCULAR; SOFT TISSUE
Status: COMPLETED | OUTPATIENT
Start: 2023-02-16 | End: 2023-02-16

## 2023-02-16 RX ADMIN — BUPIVACAINE HYDROCHLORIDE 2 ML: 2.5 INJECTION, SOLUTION INFILTRATION; PERINEURAL at 15:13

## 2023-02-16 RX ADMIN — BETAMETHASONE SODIUM PHOSPHATE AND BETAMETHASONE ACETATE 12 MG: 3; 3 INJECTION, SUSPENSION INTRA-ARTICULAR; INTRALESIONAL; INTRAMUSCULAR; SOFT TISSUE at 15:13
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
## 2023-02-17 ENCOUNTER — EVALUATION (OUTPATIENT)
Dept: PHYSICAL THERAPY | Facility: REHABILITATION | Age: 78
End: 2023-02-17

## 2023-02-17 DIAGNOSIS — M70.62 TROCHANTERIC BURSITIS OF LEFT HIP: ICD-10-CM

## 2023-02-17 DIAGNOSIS — M46.1 INFLAMMATION OF LEFT SACROILIAC JOINT (HCC): ICD-10-CM

## 2023-02-17 DIAGNOSIS — M48.061 DEGENERATIVE LUMBAR SPINAL STENOSIS: Primary | ICD-10-CM

## 2023-02-17 NOTE — PROGRESS NOTES
PT Evaluation     Today's date: 2023  Patient name: Kee Tran  : 1945  MRN: [id number]  Referring provider: Stacy Presley  Dx:   Encounter Diagnosis     ICD-10-CM    1  Degenerative lumbar spinal stenosis  M48 061 Ambulatory Referral to Physical Therapy      2  Inflammation of left sacroiliac joint ([address] )  M46 1 Ambulatory Referral to Physical Therapy      3  Trochanteric bursitis of left hip  M70 62 Ambulatory Referral to Physical Therapy          Start Time: 945  Stop Time:   Total time in clinic (min): 60 minutes    Assessment  Assessment details: Kee Tran is a 65 y o  male presenting with chronic lumbar pain and lateral L hip discomfort  Chronic generalized LBP and L hip bursitis suspected  Pt responds well to repeated flexion biased interventions  Primary impairments include L sided lumbar pain with functional activities, hip extensor and hip abduction weakness, lumbar AROM dysfunction, paraspinal motor control dysfunction  Educated pt on anatomy and physiology of diagnosis  Will benefit from skilled PT interventions for community reintegration, ADL management/independence, return to work/sport/hobbies  Impairments: abnormal coordination, abnormal muscle firing, abnormal muscle tone, abnormal or restricted ROM, activity intolerance, impaired physical strength, lacks appropriate home exercise program, pain with function, poor posture  and poor body mechanics  Functional limitations: lifting heavy objects, prologed standing/walking, household tasks  Symptom irritability: moderateBarriers to therapy: none  Understanding of Dx/Px/POC: good   Prognosis: good    Goals    Short Term Goals: In 3 weeks, the patient will:  1  Improve B hip extension strength to at least 4+/5 MMT  2  Improve lumbar extension AROM to at least 25%  3  Supervision with [facility] for self-care    Long Term Goals: In 6 weeks, the patient will:  1   Tolerate lifting 15+ pounds from the ground pain-free  2  FOTO to greater than predicted value  3   Independent with [facility] for self-care      Plan  Patient would benefit from: skilled physical therapy  Planned modality interventions: manual electrical stimulation  Planned therapy interventions: abdominal trunk stabilization, activity modification, balance, balance/weight bearing training, behavior modification, body mechanics training, community reintegration, coordination, fine motor coordination training, flexibility, functional ROM exercises, gait training, graded activity, graded exercise, graded motor, home exercise program, work reintegration, therapeutic training, therapeutic exercise, therapeutic activities, stretching, strengthening, self care, postural training, patient education, neuromuscular re-education, motor coordination training, massage, manual therapy, joint mobilization and ADL training  Frequency: 2x week  Duration in weeks: 6  Plan of Care beginning date: 2/17/2023  Plan of Care expiration date: 3/31/2023  Treatment plan discussed with: patient        Subjective     Pain Location: L hip, posterior lumbar pain  Pain Intensity: 8/10  FAHAD: slipped carrying heavy objects; chronic back pain  DOI: early December for hip pain  Aggravating Factors: running, lifting, prolonged walking, stair negotiation  Alleviating Factors: SKTC  Living Situation: cannot lift heavy objects - has fireplace  Constitutional S/S: denies n/t   Goals: "get back pain under control"  "I think the hip pain will get better over time"  PLOF: images show - Grade 1 retrolisthesis L1-L2 and grade 1 anterolisthesis L4-L5, mild hip OA; [place]      Objective          Postural Findings:     Head Position x Protracted   Neutral   Retracted   Scapular Position x Protracted   Neutral   Retracted   Thoracic Spine  x Inc Kyphosis  Neutral       Lumbar Spine   Inc Lordosis   Neutral x Dec Lordosis   Pelvis   Anterior Tilt  Neutral  x Posterior Tilt   Iliac Crest   L elevated x Neutral   R elevated   Feet   Pronated x Neutral   Supinated   Lateral Shift   Right   Left x None      Strength and ROM evaluated B from a regional biomechanical perspective and values relevant to this episode recorded in tables below  ROM:   Joint / Motion  Right  Left    Lumbar Flexion  75%     Lumbar Extension  15%*     Lumbar Sidebending  25% 15%*   Hip ER  28 25   Hip IR  30 24         Strength: MMT revealed the following findings  Joint Motion Right Left   Hip Flexion 4+/5 4+/5   Hip Abduction 3+/5 3/5   Hip Adduction 4/5 4/5   Hip Extension 4-/5 4-/5   Knee Extension 5/5 5/5   Knee Flexion 5/5 5/5   Ankle Plantarflexion 5/5 5/5   Ankle Dorsiflexion 5/5 5/5         Repeated Movements:     Standing Baseline:                                                   DURING MVMT  RESPONSE AFTER  Standing Flexion No change No change   Standing Extension worse No change      Lying Baseline:                                                   DURING MVMT                       RESPONSE AFTER  Lying Flexion better better   Lying Extension worse No change         LE Myotomes:  Nerve root Test Action RIGHT  LEFT   L1-L2 Hip Flexion intact intact   L3 Knee Extension intact intact   L4  Ankle DF intact intact   L5 Great toe Extension intact intact   S1 Ankle PF, ankle eversion, hip extension, knee flexion intact intact      S2 Knee Flexion intact intact      Additional Assessments:  Pain with palpation noted: improvement of symptoms with palpation  Passive intervertebral motion: anterior translation L4  LE dermatomes:  unremarkable        Special Tests:  Lumbar Specific and Neural Tension                                                                            Test / Measure  2/17/2023   Slump test -   Prone instability test +        SI Joint Screen: unremarkable     Treatment Based Classification: Primary flexion responder; Secondary stability     Pertinent Findings: Test / Measure  2/17/2023     FOTO 79     B hip ext MMT 4-/5     L hip abd MMT 3/5              Precautions: HTN, hx CA      Manuals 2/17                   Neuro Re-Ed    LTR 10x   SKTC 5x5" B   DKTC    Bridges    Pallof press            Ther Ex    HEP review 5'   HS S    Piriformis S Seated 30"   DL / rack pull                    Ther Activity    STS 5x   Suitcase carry    Gait Training            Modalities

## 2023-02-20 ENCOUNTER — OFFICE VISIT (OUTPATIENT)
Dept: PHYSICAL THERAPY | Facility: REHABILITATION | Age: 78
End: 2023-02-20

## 2023-02-20 DIAGNOSIS — M70.62 TROCHANTERIC BURSITIS OF LEFT HIP: ICD-10-CM

## 2023-02-20 DIAGNOSIS — M46.1 INFLAMMATION OF LEFT SACROILIAC JOINT (HCC): Primary | ICD-10-CM

## 2023-02-20 DIAGNOSIS — M48.061 DEGENERATIVE LUMBAR SPINAL STENOSIS: ICD-10-CM

## 2023-02-20 NOTE — PROGRESS NOTES
Daily Note     Today's date: 2023  Patient name: Tian Bhatti  : 1945  MRN: [id number]  Referring provider: Stephanie Katz  Dx:   Encounter Diagnosis     ICD-10-CM    1  Inflammation of left sacroiliac joint (HCC)  M46 1       2  Trochanteric bursitis of left hip  M70 62       3  Degenerative lumbar spinal stenosis  M48 061           Start Time: 0800  Stop Time: 0845  Total time in clinic (min): 45 minutes    Subjective: Pt reports back "felt good" over the weekend, but he avoided lifting  Objective: See treatment diary below      Assessment: Tolerated treatment well  Patient would benefit from continued PT  Pt able to tolerate progression of POC this tx session  Completed supine piriformis stretching due to seated piriformis stretching being bothersome over the weekend  Pt able to complete rack pulls with ease - transitioned to full DL  Pt without aggravation of symptoms throughout tx session  Challenged with anti-rotation exercises  1:1 with Kamla Faustin DPT for initial 30 minutes of tx session then with Jyoti Davis DPT for remaining 15 minutes of tx session  Plan: Progress treatment as tolerated         Precautions: HTN, hx CA      Manuals                        Neuro Re-Ed     LTR 10x 10x   SKTC 5x5" B    DKTC  2x15 w/ PB   Bridges  3x10 + hip abd iso btb   Pallof press  2x15 10#             Ther Ex     HEP review 5'    NuStep  10' L7   HS S     Piriformis S Seated 30" Supine 3x30" B   DL / rack pull  9" step  5x 10#  5x 20#  5x 25#    Floor  10x 25#                       Ther Activity     STS 5x 6x, 7x, 8x 20#   Suitcase carry  2x100' B 25#   Gait Training               Modalities

## 2023-02-23 ENCOUNTER — OFFICE VISIT (OUTPATIENT)
Dept: PHYSICAL THERAPY | Facility: REHABILITATION | Age: 78
End: 2023-02-23

## 2023-02-23 DIAGNOSIS — M48.061 DEGENERATIVE LUMBAR SPINAL STENOSIS: ICD-10-CM

## 2023-02-23 DIAGNOSIS — M46.1 INFLAMMATION OF LEFT SACROILIAC JOINT (HCC): Primary | ICD-10-CM

## 2023-02-23 DIAGNOSIS — M70.62 TROCHANTERIC BURSITIS OF LEFT HIP: ICD-10-CM

## 2023-02-27 ENCOUNTER — OFFICE VISIT (OUTPATIENT)
Dept: PHYSICAL THERAPY | Facility: REHABILITATION | Age: 78
End: 2023-02-27

## 2023-02-27 DIAGNOSIS — M70.62 TROCHANTERIC BURSITIS OF LEFT HIP: ICD-10-CM

## 2023-02-27 DIAGNOSIS — M46.1 INFLAMMATION OF LEFT SACROILIAC JOINT (HCC): Primary | ICD-10-CM

## 2023-02-27 DIAGNOSIS — M48.061 DEGENERATIVE LUMBAR SPINAL STENOSIS: ICD-10-CM

## 2023-02-27 NOTE — PROGRESS NOTES
Daily Note     Today's date: 2023  Patient name: Matthew Adair  : 1945  MRN: [id number]  Referring provider: Nay Carmichael  Dx:   Encounter Diagnosis     ICD-10-CM    1  Inflammation of left sacroiliac joint (HCC)  M46 1       2  Trochanteric bursitis of left hip  M70 62       3  Degenerative lumbar spinal stenosis  M48 061           Start Time: 0920  Stop Time: 1000  Total time in clinic (min): 40 minutes    Subjective: Pt reports improvement of symptoms since last tx session  Compliant with HEP  States he feels like his upright posture is better  Objective: See treatment diary below      Assessment: Tolerated treatment well  Patient would benefit from continued PT  Pt able to tolerate slight progression of POC this tx session to continue to address lumbar paraspinal strength and motor control  Challenged by introduction of anterior kettlebell carry as well as variation of DL incorporating cross body movements  No aggravation of pain symptomology throughout tx session  1:1 with Reginaldo Park DPT entirety of tx  Plan: Progress treatment as tolerated         Precautions: HTN, hx CA      Manuals                                Neuro Re-Ed       LTR 10x 10x     SKTC 5x5" B      DKTC  2x15 w/ PB 20x w/ PB 20x w/ PB   Bridges  3x10 + hip abd iso btb 3x10 20# 3x10 on PB   Pallof press  2x15 10# 2x15 12#    [place] curl   2x8 8# 2x8 8#   PB rollout 3 way   15x ea 15x ea   Diag chops    10x mtb          Ther Ex       HEP review 5'      NuStep  10' L7 10' L7 8' L7   HS S   3x30" B    Piriformis S Seated 30" Supine 3x30" B     DL / rack pull  9" step  5x 10#  5x 20#  5x 25#    Floor  10x 25#  X-body  2x5 B 15#   Unilat row + trunk rot   2x12 B 12# 15x B 14#   Unilat Sh ext    10x B 12#                 Ther Activity       STS 5x 6x, 7x, 8x 20# 6x 25#  2x6 20#    Suitcase carry  2x100' B 25#  2x100' B 25#   Anterior KB carry    2x100' 15#   Gait Training Modalities

## 2023-03-02 ENCOUNTER — OFFICE VISIT (OUTPATIENT)
Dept: PHYSICAL THERAPY | Facility: REHABILITATION | Age: 78
End: 2023-03-02

## 2023-03-02 DIAGNOSIS — M70.62 TROCHANTERIC BURSITIS OF LEFT HIP: ICD-10-CM

## 2023-03-02 DIAGNOSIS — M48.061 DEGENERATIVE LUMBAR SPINAL STENOSIS: ICD-10-CM

## 2023-03-02 DIAGNOSIS — M46.1 INFLAMMATION OF LEFT SACROILIAC JOINT (HCC): Primary | ICD-10-CM

## 2023-03-02 NOTE — PROGRESS NOTES
Daily Note     Today's date: 3/2/2023  Patient name: Foreign Rodriguez  : 1945  MRN: [id number]  Referring provider: Dahlia Berrios  Dx:   Encounter Diagnosis     ICD-10-CM    1  Inflammation of left sacroiliac joint (HCC)  M46 1       2  Trochanteric bursitis of left hip  M70 62       3  Degenerative lumbar spinal stenosis  M48 061           Start Time: 1000  Stop Time: 1038  Total time in clinic (min): 38 minutes    Subjective: Pt reports continued improvement of symptoms  States he is feeling stronger already  Objective: See treatment diary below      Assessment: Tolerated treatment well  Patient would benefit from continued PT  Pt able to tolerate slight progressions of current POC this tx session  Lumbar and hip dysfunction nearly resolved - would benefit from transition to 1x per week  Paraspinal and proximal hip endurance remains limited  Motor control and upright posture is improving  1:1 with Nitesh Reyes DPT entirety of tx  Plan: Progress treatment as tolerated  Frequency to 1x per week       Precautions: HTN, hx CA    Pertinent Findings:                                    Test / Measure  2023 3/2/2023    FOTO - lumbar 67 94    FOTO - hip 67 81   B hip ext MMT 4-/5     L hip abd MMT 3/5         Manuals  3                                   Neuro Re-Ed        LTR 10x 10x      SKTC 5x5" B       DKTC  2x15 w/ PB 20x w/ PB 20x w/ PB    Bridges  3x10 + hip abd iso btb 3x10 20# 3x10 on PB    Pallof press  2x15 10# 2x15 12#     [place]reston curl   2x8 8# 2x8 8# 2x8 8#   PB rollout 3 way   15x ea 15x ea 10x ea   Diag chops    10x mtb 10x B 7#   Diag lifts     10x B 7#   Ther Ex        HEP review 5'       NuStep  10' L7 10' L7 8' L7 10' L8   HS S   3x30" B     Piriformis S Seated 30" Supine 3x30" B      DL / rack pull  9" step  5x 10#  5x 20#  5x 25#    Floor  10x 25#  X-body  2x5 B 15#    Unilat row + trunk rot   2x12 B 12# 15x B 14#    Unilat Sh ext    10x B 12#    [place] seated good mornings     2x10 15#           Ther Activity        STS 5x 6x, 7x, 8x 20# 6x 25#  2x6 20#  2x6 20#    suitcase hold 8x B 15#   Suitcase carry  2x100' B 25#  2x100' B 25#    Anterior KB carry    2x100' 15#    Sidestepping     3 laps gtb   Google walk - fwd/bwd     3 laps gtb   Gait Training                        Modalities

## 2023-03-06 ENCOUNTER — OFFICE VISIT (OUTPATIENT)
Dept: PHYSICAL THERAPY | Facility: REHABILITATION | Age: 78
End: 2023-03-06

## 2023-03-06 DIAGNOSIS — M70.62 TROCHANTERIC BURSITIS OF LEFT HIP: ICD-10-CM

## 2023-03-06 DIAGNOSIS — M46.1 INFLAMMATION OF LEFT SACROILIAC JOINT (HCC): Primary | ICD-10-CM

## 2023-03-06 DIAGNOSIS — M48.061 DEGENERATIVE LUMBAR SPINAL STENOSIS: ICD-10-CM

## 2023-03-06 NOTE — PROGRESS NOTES
Daily Note     Today's date: 3/6/2023  Patient name: Gene Llanos  : 1945  MRN: [id number]  Referring provider: Rona Palafox  Dx:   Encounter Diagnosis     ICD-10-CM    1  Inflammation of left sacroiliac joint (HCC)  M46 1       2  Trochanteric bursitis of left hip  M70 62       3  Degenerative lumbar spinal stenosis  M48 061           Start Time: 9699  Stop Time: 6253  Total time in clinic (min): 38 minutes    Subjective: Pt reports slight improvement of symptoms  States slight pain aggravation with bridges and hip extension  Objective: See treatment diary below      Assessment: Tolerated treatment well  Patient would benefit from continued PT  Pt tolerated continued progression of POC this tx session without adverse pain effects  Reviewed HEP and discussed the removal of bridges and hip extension due to lumbar pain increase  Discussed the properties of a flexion bias with the pt   1:1 with Leidy Macario DPT entirety of tx  Plan: Progress treatment as tolerated         Precautions: HTN, hx CA    Pertinent Findings:                                    Test / Measure  2023 3/2/2023    FOTO - lumbar 67 94    FOTO - hip 67 81   B hip ext MMT 4-/5     L hip abd MMT 3/5         Manuals 2/17 2/20 2/23 2/27 3/2 3                                       Neuro Re-Ed         LTR 10x 10x       SKTC 5x5" B        DKTC  2x15 w/ PB 20x w/ PB 20x w/ PB     Bridges  3x10 + hip abd iso btb 3x10 20# 3x10 on PB     Pallof press  2x15 10# 2x15 12#   2x15 14#   Preston curl   2x8 8# 2x8 8# 2x8 8# 2x8 10#   PB rollout 3 way   15x ea 15x ea 10x ea    Diag chops    10x mtb 10x B 7# 10x B 10#   Diag lifts     10x B 7# 10x B 8#   Ther Ex         HEP review 5'        NuStep  10' L7 10' L7 8' L7 10' L8 10' L8   HS S   3x30" B      Piriformis S Seated 30" Supine 3x30" B       DL / rack pull  9" step  5x 10#  5x 20#  5x 25#    Floor  10x 25#  X-body  2x5 B 15#  X-body  2x5 B 20#   Unilat row + trunk rot   2x12 B 12# 15x B 14#     Unilat Sh ext    10x B 12#     [place] seated good mornings     2x10 15# 2x12 17# + row            Ther Activity         STS 5x 6x, 7x, 8x 20# 6x 25#  2x6 20#  2x6 20#    suitcase hold 8x B 15#    Suitcase carry  2x100' B 25#  2x100' B 25#     Anterior KB carry    2x100' 15#  250' 20#   Sidestepping     3 laps gtb 4 laps btb   [place]ster walk - fwd/bwd     3 laps gtb 4 laps btb   Gait Training                           Modalities

## 2023-03-15 ENCOUNTER — OFFICE VISIT (OUTPATIENT)
Dept: PHYSICAL THERAPY | Facility: REHABILITATION | Age: 78
End: 2023-03-15

## 2023-03-15 DIAGNOSIS — M46.1 INFLAMMATION OF LEFT SACROILIAC JOINT (HCC): Primary | ICD-10-CM

## 2023-03-15 DIAGNOSIS — M70.62 TROCHANTERIC BURSITIS OF LEFT HIP: ICD-10-CM

## 2023-03-15 DIAGNOSIS — M48.061 DEGENERATIVE LUMBAR SPINAL STENOSIS: ICD-10-CM

## 2023-03-15 NOTE — PROGRESS NOTES
Daily Note     Today's date: 3/15/2023  Patient name: Scarlett Rowley  : 1945  MRN: [id number]  Referring provider: [place]  Dx:   Encounter Diagnosis     ICD-10-CM    1  Inflammation of left sacroiliac joint (HCC)  M46 1       2  Trochanteric bursitis of left hip  M70 62       3  Degenerative lumbar spinal stenosis  M48 061           Start Time: 0945  Stop Time: 1030  Total time in clinic (min): 45 minutes    Subjective: Pt reports improvement in pain symptomology this morning, but he states lumbar tightness prior to start of tx session  Compliant with HEP  Objective: See treatment diary below      Assessment: Tolerated treatment well  Patient would benefit from continued PT  Pt able to tolerate continued progression of POC to facilitate improvement of symptoms  Mild lumbar paraspinal and BLE fatigue post-session, but no aggravation of pain symptomology throughout tx session  Functionality is gradually improving with progressed therapeutic interventions  1:1 with Lyric Martinez DPT entirety of tx  Plan: Progress treatment as tolerated         Precautions: HTN, hx CA    Pertinent Findings:                                    Test / Measure  2023 3/2/2023    FOTO - lumbar 67 94    FOTO - hip 67 81   B hip ext MMT 4-/5     L hip abd MMT 3/5         Manuals 2/17 2/20 2/23 2/27 3/2 3/6 3/15                                           Neuro Re-Ed          LTR 10x 10x        SKTC 5x5" B         DKTC  2x15 w/ PB 20x w/ PB 20x w/ PB      Bridges  3x10 + hip abd iso btb 3x10 20# 3x10 on PB      Pallof press  2x15 10# 2x15 12#   2x15 14# 15x 15#   [place]on curl   2x8 8# 2x8 8# 2x8 8# 2x8 10# 8x 12#  8x 20#   PB rollout 3 way   15x ea 15x ea 10x ea     Diag chops    10x mtb 10x B 7# 10x B 10# 10x B 12#   Diag lifts     10x B 7# 10x B 8# 10x B 9#   Ther Ex          HEP review 5'         NuStep  10' L7 10' L7 8' L7 10' L8 10' L8 10' L8   HS S   3x30" B       Piriformis S Seated 30" Supine 3x30" B        DL / rack pull  9" step  5x 10#  5x 20#  5x 25#    Floor  10x 25#  X-body  2x5 B 15#  X-body  2x5 B 20#    Unilat row + trunk rot   2x12 B 12# 15x B 14#      Unilat Sh ext    10x B 12#      Piter seated good mornings     2x10 15# 2x12 17# + row 2x12 20# + row             Ther Activity          STS 5x 6x, 7x, 8x 20# 6x 25#  2x6 20#  2x6 20#    suitcase hold 8x B 15#  suitcase hold 10x B 15#   Suitcase carry  2x100' B 25#  2x100' B 25#      Anterior KB carry    2x100' 15#  250' 20# 3x100' 25#   Sidestepping     3 laps gtb 4 laps btb 4 laps btb   Monster walk - fwd/bwd     3 laps gtb 4 laps btb 4 laps btb   Gait Training                              Modalities

## 2023-03-22 ENCOUNTER — OFFICE VISIT (OUTPATIENT)
Dept: PHYSICAL THERAPY | Facility: REHABILITATION | Age: 78
End: 2023-03-22

## 2023-03-22 DIAGNOSIS — M70.62 TROCHANTERIC BURSITIS OF LEFT HIP: ICD-10-CM

## 2023-03-22 DIAGNOSIS — M46.1 INFLAMMATION OF LEFT SACROILIAC JOINT (HCC): Primary | ICD-10-CM

## 2023-03-22 DIAGNOSIS — M48.061 DEGENERATIVE LUMBAR SPINAL STENOSIS: ICD-10-CM

## 2023-03-22 NOTE — PROGRESS NOTES
Daily Note     Today's date: 3/22/2023  Patient name: Shalom Smith  : 1945  MRN: [id number]  Referring provider: Aura Rivas  Dx:   Encounter Diagnosis     ICD-10-CM    1  Inflammation of left sacroiliac joint (HCC)  M46 1       2  Trochanteric bursitis of left hip  M70 62       3  Degenerative lumbar spinal stenosis  M48 061           Start Time: 09  Stop Time: 1032  Total time in clinic (min): 40 minutes    Subjective: Pt reports continued improvement of lumbar pain symptomology  Mild fatigue noted following last tx session  Objective: See treatment diary below      Assessment: Tolerated treatment well  Patient would benefit from continued PT  Pt able to tolerate continued progression of POC this tx session  Challenged with windmill variations today  Increase in resistances proved fatiguing as well  Mild BLE and lumbar paraspinal fatigue post-session  No aggravation of pain symptoms throughout tx session  Minimal VCs provided to improve upright posture when completing repetitions during cross body dead lifts and other standing interventions  1:1 with Gustavo Vizcarra, DPT entirety of tx  Plan: Progress treatment as tolerated  Potential discharge next session depending on pt status throughout the week       Precautions: HTN, hx CA    Pertinent Findings:                                    Test / Measure  2023 3/2/2023    FOTO - lumbar 67 94    FOTO - hip 67 81   B hip ext MMT 4-/5     L hip abd MMT 3/5         Manuals 2/17 2/20 2/23 2/27 3/2 3/6 3/15 3/22                                               Neuro Re-Ed           LTR 10x 10x         SKTC 5x5" B          DKTC  2x15 w/ PB 20x w/ PB 20x w/ PB       Bridges  3x10 + hip abd iso btb 3x10 20# 3x10 on PB       Pallof press  2x15 10# 2x15 12#   2x15 14# 15x 15#    Preston curl   2x8 8# 2x8 8# 2x8 8# 2x8 10# 8x 12#  8x 20# 8x 20#  8x 25#   PB rollout 3 way   15x ea 15x ea 10x ea      Diag chops    10x mtb 10x B 7# 10x B 10# 10x B 12#    Diag lifts     10x B 7# 10x B 8# 10x B 9#    High windmill        2x5 B 5#   Low windmill        2x5 B 5#   Ther Ex           HEP review 5'          NuStep  10' L7 10' L7 8' L7 10' L8 10' L8 10' L8 8' L9  2' L8   HS S   3x30" B        Piriformis S Seated 30" Supine 3x30" B         DL / rack pull  9" step  5x 10#  5x 20#  5x 25#    Floor  10x 25#  X-body  2x5 B 15#  X-body  2x5 B 20#  X-body 2x5 B 35#   Unilat row + trunk rot   2x12 B 12# 15x B 14#       Unilat Sh ext    10x B 12#       [place] seated good mornings     2x10 15# 2x12 17# + row 2x12 20# + row 2x12 25# + row              Ther Activity           STS 5x 6x, 7x, 8x 20# 6x 25#  2x6 20#  2x6 20#    suitcase hold 8x B 15#  suitcase hold 10x B 15# Suitcase hold 2x8 B 20#   Suitcase carry  2x100' B 25#  2x100' B 25#       Anterior KB carry    2x100' 15#  250' 20# 3x100' 25#    Sidestepping     3 laps gtb 4 laps btb 4 laps btb    Monster walk - fwd/bwd     3 laps gtb 4 laps btb 4 laps btb    Gait Training                                 Modalities

## 2023-03-29 ENCOUNTER — OFFICE VISIT (OUTPATIENT)
Dept: PHYSICAL THERAPY | Facility: REHABILITATION | Age: 78
End: 2023-03-29

## 2023-03-29 DIAGNOSIS — M46.1 INFLAMMATION OF LEFT SACROILIAC JOINT (HCC): Primary | ICD-10-CM

## 2023-03-29 DIAGNOSIS — M48.061 DEGENERATIVE LUMBAR SPINAL STENOSIS: ICD-10-CM

## 2023-03-29 DIAGNOSIS — M70.62 TROCHANTERIC BURSITIS OF LEFT HIP: ICD-10-CM

## 2023-03-29 NOTE — PROGRESS NOTES
"Daily Note     Today's date: 3/29/2023  Patient name: Debbie Huggins  : 1945  MRN: [id number]  Referring provider: Shamar Reese  Dx:   Encounter Diagnosis     ICD-10-CM    1  Inflammation of left sacroiliac joint (HCC)  M46 1       2  Trochanteric bursitis of left hip  M70 62       3  Degenerative lumbar spinal stenosis  M48 061           Start Time: 09  Stop Time: 1032  Total time in clinic (min): 38 minutes    Subjective: Pt reports slight lumbar discomfort over the past several days  Able to complete yard work and house work recently  States his R hand has some pain from over-working yesterday  Able to lift 40 pound bag from home improvement store  Able to modulate lumbar tightness with stretches  Objective: See treatment diary below      Assessment: Tolerated treatment well  Patient would benefit from continued PT  Pt able to tolerate continued and slight progression of POC this tx session without any further aggravation of lumbar discomfort  Strength and motor control of L hip and lumbar paraspinals continues to improve  1:1 with Ginette Almnote DPT entirety of tx  Plan: Progress treatment as tolerated  Decrease frequency to 1x per every 2 weeks       Precautions: HTN, hx CA    Pertinent Findings:                                    Test / Measure  2023 3/2/2023    FOTO - lumbar 67 94    FOTO - hip 67 81   B hip ext MMT 4-/5     L hip abd MMT 3/5         Manuals 2/17 2/20 2/23 2/27 3/2 3/6 3/15 3/22 3/29                                                   Neuro Re-Ed            LTR 10x 10x          SKTC 5x5\" B           DKTC  2x15 w/ PB 20x w/ PB 20x w/ PB        Bridges  3x10 + hip abd iso btb 3x10 20# 3x10 on PB        Pallof press  2x15 10# 2x15 12#   2x15 14# 15x 15#     Preston curl   2x8 8# 2x8 8# 2x8 8# 2x8 10# 8x 12#  8x 20# 8x 20#  8x 25# 2x8 25#   PB rollout 3 way   15x ea 15x ea 10x ea       Diag chops    10x mtb 10x B 7# 10x B 10# 10x B 12#     Diag " "lifts     10x B 7# 10x B 8# 10x B 9#     High windmill        2x5 B 5# 2x5 B 5#   Low windmill        2x5 B 5#    Ther Ex            HEP review 5'           NuStep  10' L7 10' L7 8' L7 10' L8 10' L8 10' L8 8' L9  2' L8 10' L8   HS S   3x30\" B         Piriformis S Seated 30\" Supine 3x30\" B          DL / rack pull  9\" step  5x 10#  5x 20#  5x 25#    Floor  10x 25#  X-body  2x5 B 15#  X-body  2x5 B 20#  X-body 2x5 B 35#    Unilat row + trunk rot   2x12 B 12# 15x B 14#        Unilat Sh ext    10x B 12#        Piter seated good mornings     2x10 15# 2x12 17# + row 2x12 20# + row 2x12 25# + row 2x12 30# + row               Ther Activity            STS 5x 6x, 7x, 8x 20# 6x 25#  2x6 20#  2x6 20#    suitcase hold 8x B 15#  suitcase hold 10x B 15# Suitcase hold 2x8 B 20# + hip abd iso mtb 3x10 25#   Suitcase carry  2x100' B 25#  2x100' B 25#        Anterior KB carry    2x100' 15#  250' 20# 3x100' 25#  3x100' 25#   Sidestepping     3 laps gtb 4 laps btb 4 laps btb  4 laps mtb   Monster walk - fwd/bwd     3 laps gtb 4 laps btb 4 laps btb  4 laps mtb   Gait Training                                    Modalities                                         "

## 2023-04-26 ENCOUNTER — OFFICE VISIT (OUTPATIENT)
Dept: PHYSICAL THERAPY | Facility: REHABILITATION | Age: 78
End: 2023-04-26

## 2023-04-26 DIAGNOSIS — M46.1 INFLAMMATION OF LEFT SACROILIAC JOINT (HCC): Primary | ICD-10-CM

## 2023-04-26 DIAGNOSIS — M48.061 DEGENERATIVE LUMBAR SPINAL STENOSIS: ICD-10-CM

## 2023-04-26 DIAGNOSIS — M70.62 TROCHANTERIC BURSITIS OF LEFT HIP: ICD-10-CM

## 2023-04-26 NOTE — PROGRESS NOTES
Discharge Summary     Today's date: 2023  Patient name: Jayjay Joseph  : 1945  MRN: [id number]  Referring provider: Pro Rogers  Dx:   Encounter Diagnosis     ICD-10-CM    1  Inflammation of left sacroiliac joint (HCC)  M46 1       2  Trochanteric bursitis of left hip  M70 62       3  Degenerative lumbar spinal stenosis  M48 061           Start Time: 1000  Stop Time: 1045  Total time in clinic (min): 45 minutes    Subjective: Pt reports current rehabilitation status is at about 90%  He states that pain can be aggravated with wrong movements  Sharp pains can occur with excessive side bending and back bending  Pain up to 3/10 intensity  He is able to complete all tasks at home but modifies his movements  He performs yard work but does not bend down to complete, he instead gets down on the ground in order to prevent onset of lumbar discomfort  He believes that he is standing up straighter and feels his muscles are stronger  Objective: See treatment diary below    Strength and ROM evaluated B from a regional biomechanical perspective and values relevant to this episode recorded in tables below  ROM:   Joint / Motion  Right  Left    Lumbar Flexion  90%     Lumbar Extension  25%*     Lumbar Sidebending  40% 40%*   Hip ER  28 28   Hip IR  30 30         Strength: MMT revealed the following findings    Joint Motion Right Left   Hip Flexion 4+/5 4+/5   Hip Abduction 4+/5 4+/5   Hip Adduction 4+/5 4+/5   Hip Extension 4+/5 4+/5   Knee Extension 5/5 5/5   Knee Flexion 5/5 5/5   Ankle Plantarflexion 5/5 5/5   Ankle Dorsiflexion 5/5 5/5         LE Myotomes:  Nerve root Test Action RIGHT  LEFT   L1-L2 Hip Flexion intact intact   L3 Knee Extension intact intact   L4  Ankle DF intact intact   L5 Great toe Extension intact intact   S1 Ankle PF, ankle eversion, hip extension, knee flexion intact intact      S2 Knee Flexion intact intact      Additional Assessments:  Pain with palpation noted: mild TTP at L sided lumbar region at L3-S1        Special Tests:  Lumbar Specific and Neural Tension                                                                            Test / Measure  2/17/2023 4/26/2023   Slump test - -   Prone instability test + +       Assessment: Tolerated treatment well  Patient exhibited good technique with therapeutic exercises  1:1 with Angie Sexton DPT entirety of tx  Pt made great progress through formal outpatient physical therapy services  All short-term and long-term goals have been met  Pt would benefit from continued completion of written home exercise program in order to maintain progress made in PT and potentially and gradually progress lumbar status further  Slight tenderness persists at lumbar region but much improved since initial evaluation  Strength and mobility gains have been observed throughout tx sessions  Further reviewed HEP and advised pt to continue  Assessment details: Veena Bryant is a 65 y o  male presenting with chronic lumbar pain and lateral L hip discomfort  Chronic generalized LBP and L hip bursitis suspected  Pt responds well to repeated flexion biased interventions  Primary impairments include L sided lumbar pain with functional activities, hip extensor and hip abduction weakness, lumbar AROM dysfunction, paraspinal motor control dysfunction  Educated pt on anatomy and physiology of diagnosis  Will benefit from skilled PT interventions for community reintegration, ADL management/independence, return to work/sport/hobbies      Impairments: abnormal coordination, abnormal muscle firing, abnormal muscle tone, abnormal or restricted ROM, activity intolerance, impaired physical strength, lacks appropriate home exercise program, pain with function, poor posture  and poor body mechanics  Functional limitations: lifting heavy objects, prologed standing/walking, household tasks  Symptom irritability: moderateBarriers to therapy: "none  Understanding of Dx/Px/POC: good   Prognosis: good    Goals    Short Term Goals: In 3 weeks, the patient will:  1  Improve B hip extension strength to at least 4+/5 MMT - MET  2  Improve lumbar extension AROM to at least 25% - MET  3  Supervision with HEP for self-care - MET    Long Term Goals: In 6 weeks, the patient will:  1  Tolerate lifting 15+ pounds from the ground pain-free - MET  2  FOTO to greater than predicted value - MET  3  Independent with HEP for self-care - MET    Plan: Discharge from skilled outpatient physical therapy services at this time to complete written home exercise program independently       Precautions: HTN, hx CA  Pertinent Findings:                                    Test / Measure  2/17/2023 3/2/2023  4/26/2023   FOTO - lumbar 67 94     FOTO - hip 67 81    B hip ext MMT 4-/5   4+/5   L hip abd MMT 3/5   4+/5       Manuals 2/27 3/2 3/6 3/15 3/22 3/29 4/12 4/26                                               Neuro Re-Ed           LTR           SKTC           DKTC 20x w/ PB          Bridges 3x10 on PB          Pallof press   2x15 14# 15x 15#       Preston curl 2x8 8# 2x8 8# 2x8 10# 8x 12#  8x 20# 8x 20#  8x 25# 2x8 25# 2x8 25#    PB rollout 3 way 15x ea 10x ea         Diag chops 10x mtb 10x B 7# 10x B 10# 10x B 12#       Diag lifts  10x B 7# 10x B 8# 10x B 9#       High windmill     2x5 B 5# 2x5 B 5#     Low windmill     2x5 B 5#      Ther Ex           HEP review           NuStep 8' L7 10' L8 10' L8 10' L8 8' L9  2' L8 10' L8 10' L8 10' L8   HS S           Piriformis S        2x30\" B   DL / rack pull X-body  2x5 B 15#  X-body  2x5 B 20#  X-body 2x5 B 35#      Unilat row + trunk rot 15x B 14#          Unilat Sh ext 10x B 12#          [place] seated good mornings  2x10 15# 2x12 17# + row 2x12 20# + row 2x12 25# + row 2x12 30# + row 2x15 30# + row  10x B unilat    Reverse hypers       2x8    Unilat Sh ext       2x10 B 14#    LHB S + curl        2x30\"/10 5#   R UT S        3x30\"   Shrug " + rot        10x ea 10#   Shrug + lat flex        10x ea 10#   Ther Activity           STS  2x6 20#    suitcase hold 8x B 15#  suitcase hold 10x B 15# Suitcase hold 2x8 B 20# + hip abd iso mtb 3x10 25# + hip abd iso mtb 3x10 25#    Suitcase carry 2x100' B 25#      + FSU  10x ea B 25#    Anterior KB carry 2x100' 15#  250' 20# 3x100' 25#  3x100' 25# 3x100' 25#    Sidestepping  3 laps gtb 4 laps btb 4 laps btb  4 laps mtb     Monster walk - fwd/bwd  3 laps gtb 4 laps btb 4 laps btb  4 laps mtb     Gait Training                                 Modalities

## 2023-06-20 ENCOUNTER — TELEPHONE (OUTPATIENT)
Dept: DERMATOLOGY | Facility: CLINIC | Age: 78
End: 2023-06-20

## 2023-06-20 NOTE — LETTER
Lena Conrad     1945    [facility] [place] [address]    Dear Sherry Salmeron are scheduled to have the MOHS procedure on September 7, 2023 at 11:00 am for right scalp with Dr Ronny Zhang  Also September 14, 2023 at 9:00 am with Dr Mattie Gagnon for neck  Our office is located in [facility] at the [facility] our address is [address] [place] [address], [address] [place] [address]  Once you arrive please check in with our front staff in [address] and they will escort you to the [place] [address] waiting room  If you have someone bringing you to your appointment they may wait in the waiting room or accompany you in your visit  Below you will find some pre-op instructions along with some information regarding the MOHS procedure  If you have any questions please call our office at [phone]  Thank you,    [facility] MOHS Department         PRE-OPERATIVE INSTRUCTIONS - MOHS    Before your scheduled surgery, there are a number of important precautions and positive steps you should take to help prepare yourself for a successful treatment and speedy recovery  Some of the steps, which are listed below, may seem unnecessary and inconvenient, but they are important  For example, when you stop smoking, you increase your ability to heal  Occasionally, there may be valid reasons, personal or medical, why you can't comply  In such cases, please call the office so we can discuss possible ways to overcome any obstacles you may be encountering  If you have any questions about the surgery, or remember additional medical information that you forgot to mention to our staff, please contact the office prior to your surgery  GENERAL INFORMATION REGARDING MOHS MICROGRAPHIC SURGERY    Mohs surgery is a specialized technique for the removal of skin cancer developed by Dr Stefano Nora Mohs over 50 years ago to improve the cure rates of skin cancer   Traditionally, skin cancers are treated by destructive methods (radiation, freezing, scraping, and burning) or excision (cutting out the tissue with standards margins and sending it to an outside laboratory for testing)  These methods all yield cure rates between 65%-94%  However, for cancers located in cosmetically sensitive areas, large tumors, or tumors unsuccessfully treated by other means, Mohs surgery offers a higher cure rate  In most cases, Mohs surgery provides you with a 99% cure rate for primary (previously untreated) basal cell cancer and a 95% cure rate for primary squamous cell cancer  In Mohs surgery, tissue is removed and processed in a way that we are able to check 100% of the margins, giving the highest cure rate for any method of treating skin cancers while providing maximal preservation of normal skin  This allows the surgeon to produce an optimal cosmetic result for the patient by maximizing the amount of tissue removed yielding as small a scar as possible    On the day of surgery, you will be given local anesthesia only (similar to what was given to you during your initial biopsy)  You will remain awake  You will verify the location of the skin cancer prior to the onset of the surgery  Once the area is numb, the tissue containing the skin cancer will be removed, taking a small safety margin  This margin is usually smaller than what would be taken with a standard excision  Once the tissue is removed, it is marked and oriented  The first layer (“Stage I”) will be processed in our laboratory  The wound will be treated for bleeding and a bandage will be placed to keep you comfortable while you wait an approximate 45 minutes-1 hour (for the processing of the tissue) in your room  Your Mohs surgeon will examine the pathology in the lab, checking all the margins  If any tumor remains, you will need to take a second layer of skin (“Stage 2”)   The area will be re-anesthetized and your Mohs surgeon will remove more skin only in the area where the tumor exists  This process will continue until all the skin cancer is removed  Unfortunately, there is no method to predict how many layers or stages will be taken  Once the tumor has been removed completely, we will discuss the best ways to close the defect  Most wounds may be closed with stitches  A larger wound may require a skin graft or a flap  In rare instances, especially for cancers around the eye or for larger cancers, we may work with another surgeon (oculoplastic, ENT, plastics) with special skills to assist with reconstruction  Medications: Please take all your normal medications the morning of your surgery  If you are a diabetic, please bring your insulin or medications with you, as well as a snack to avoid having low blood sugar during your day with us  Blood Thinners    Ask the doctor (that prescribed the medication) if prior to surgery you should stop your prescribed blood thinners, such as Coumadin/Warfarin, Plavix, Eliquis, Pradaxa, Brilinta, Apixaban, Xarelto, Lovonox, Rivaroxaban, or Aggrenox  NEVER stop them without your doctor's permission or knowledge  If you have had a stroke, heart attack, or have an irregular heartbeat, your doctor may want you to continue your medication  We can still do your surgery  You may have more bruising  VERY IMPORTANT: If you take aspirin because you have had a stroke, heart attack, heart disease, other condition, or your physician has prescribed you to take it, please continue your aspirin  Most people should stop all non-steroidal anti-inflammatory medications (Motrin, Naproxen, Advil, Midol, Aleve, etc ) for 7 days prior to your scheduled surgery and 2 days after (unless instructed otherwise after surgery)  You may take Tylenol for pain  Antibiotics  If you usually require antibiotics prior to dental work, please let the office know at least 24 hours prior to your surgery   Medical conditions that sometimes require preoperative antibiotics include artificial heart valves, heart murmurs, artificial joints, and related problems  We will give you a different medication than the dentist, so please contact us for the correct antibiotic  If you were prescribed pre-operative antibiotics by our office, please take the medication 2 hours prior to your procedure  Vitamins and Supplements  Avoid taking any supplements with Vitamin E, Fish Oil, Gingko, Ginseng, and Garlic for 2 weeks before and 2 days after your surgery  These thin your blood    Alcohol: Avoid drinking alcohol for 2 days prior to your surgery, and for 2 days afterwards (it thins the blood and causes more bruising and swelling)  Smoking: Try to STOP or reduce smoking significantly the week before your surgery, and especially the week afterwards (it greatly improves how well you heal)  Tobacco smoke deprives the blood of oxygen, which is urgently needed by the wound during the healing process  Contact Lenses: Do not wear them on the day of the surgery  Instead, wear glasses and bring your case, in case we need to remove them  Clothing: Do not wear your nicest clothing on your surgery day  We recommend wearing a button down shirt that will not disrupt your post-operative dressing when changing later that night  Bathing: On the morning of your surgery, you may bathe or shower normally  If you get your hair done on a weekly basis, remember to get your hair washed the day before surgery    - You will need to keep your surgical site dry for a minimum of 48 hours after surgery  Makeup: If your surgery is on the face, please do not wear any makeup on the day of the surgery  Jewelry: Please try to avoid wearing jewelry on the day of surgery  Food: On the morning of surgery, have breakfast but limit your intake of caffeinated beverages  They are diuretic and may inconvenience you during surgery   If you are following up with another surgeon the same day as your Mohs surgery, you must receive permission to eat breakfast from that surgeon  What to bring with you on the day of your surgery:  Bring snacks - Since you could be at the office long, you may bring snacks and/or lunch with you  Some snacks and drinks are available at the office as well  Bring a sweater - Bring a sweater or jacket that buttons or zips down the front and will not disturb your wound dressing during removal   Bring something to do - You will be spending much of the day in our office  There will be 45-60 minute waiting periods  between layers/stages, and while there is a television with cable in every room, it is nice to have something to keep you occupied such as books, magazines, knitting, music, or work  Planning Ahead:  Other Appointments - It is important to realize that no matter how small the skin cancer appears to be, looks can be deceiving  Since your surgery may last the entire day, you should not schedule any other appointments that day  Special Occasions - Surgery often creates swelling and bruising  Also, the post-op dressing may be rather large and obvious  Keep this in mind as you arrange your social/work schedule  If an important event is already planned, please check with your referring physician or your Mohs surgeon to see if the surgery can be postponed  Activity Limits after Surgery - If surgery was performed on your face, we recommend that you keep your activity level to a minimum for 2-3 days (the blood pressure elevation related to exercise can lead to bleeding)  If you have stitches in an area that will be under tension or significant movement (neck, back, arms, legs), you will need to avoid heavy lifting (anything over 5 lbs) or exercise for at least 2 weeks and possibly longer  We also advise that you limit out of town travel for the first 7 days after surgery  You should also wait at least 7 days before going into a pool or the ocean    Housework - Since you will need to minimize activity after surgery, plan to do your groceries, laundry, gardening, and other heavy household chores prior to your surgery  Please make arrangements for assistance during the post-op period  If surgery is around your mouth area, you may need to eat soft foods, such as soup, milkshakes, or yogurt for 48 hours  Purchasing bandage supplies: Prior to surgery, please purchase the following items to care for your surgical wound properly  Cotton swabs (Q-tips)  Vaseline or Aquaphor  Telfa pads (or any non-stick dressing)  Paper tape or Hypafix tape  Gauze pads (3x3)      We will provide you with some bandage supplies after surgery to get you started  Transportation: It is often reassuring and comforting to have a  drive you to and from the surgery  He or she is welcome to wait in the office during the surgery  Please note that for safety reasons, only the patient is allowed in the procedure room during surgery  Thank you for your cooperation  Rescheduling: If you need to reschedule your surgery, please notify the office as soon as possible

## 2023-06-20 NOTE — TELEPHONE ENCOUNTER
Records received, scanned into chart  Mohs Referral for [facility] left neck & SCC right scalp

## 2023-06-26 NOTE — TELEPHONE ENCOUNTER
Pre- operative Mohs Telephone Scheduling Note    Do you have a pacemaker, defibrillator, spinal or brain stimulator? no    Do you take antibiotics before skin or dental procedures? no  If yes, will likely require pre-operative antibiotics  Ask  the patient why they take the antibiotics (usually because of joint replacement)  Do you have a history of a joint replacements within the past 2 years? no   If yes, will likely require pre-operative antibiotics  Ask if orthopaedic surgeon has prescribed pre-operative antibiotics to take before procedures/dental work? Do you take any OTC medications that thin your blood (Aspirin, Aleve, Ibuprofen) or supplements that thin your blood (fish oil, garlic, vitamin E, Ginko Biloba)? yes: fish oil     Do you take any prescribed medications that thin your blood (Coumadin, Plavix, Xarelto, Eliquis or another prescribed blood thinner)? no    Do you have an allergy to lidocaine or epinephrine? no    Do you have allergies to Iodine? no    Do you wear a lidocaine patch? no    Have you ever been diagnosed with HIV, AIDS, Hep B and Hep C? no    Do you use a cane, walker or wheelchair? no    Is the patient from a nursing home? no If yes, Is there any special accommodations that is needed for patient n/a    Do you smoke? no      If yes,  patient to try and stop 2 days before surgery and 7 days after the surgery  Minimizing smoking as much as possible during this time will improve healing and the cosmetic result after surgery  Do you use supplemental oxygen? If so, how many liters and can you be off it for a short period of time? n/a    Date scheduled: September 7, 2023 @ 11:00 am with Dr Lawyer Nielson & September 14, 2023 @ 9 am with Dr Verle Rubinstein of Care with other provider ([place] [id number], ENT) required? no   IF YES, PLEASE FORWARD TO APPROPRIATE PERSONNEL TO HELP COORDINATE  Are there remaining tumors to be scheduled? no    Was Prior Authorization obtained? No (please use  [facility] to document prior auth)

## 2023-06-26 NOTE — TELEPHONE ENCOUNTER
Spoke to patient and I will be sending him a [employer] message and asked him to respond with the photos of the sites he needs MOHS on  Patient understood

## 2023-06-27 ENCOUNTER — LAB REQUISITION (OUTPATIENT)
Dept: LAB | Facility: HOSPITAL | Age: 78
End: 2023-06-27
Payer: MEDICARE

## 2023-06-27 ENCOUNTER — APPOINTMENT (OUTPATIENT)
Dept: RADIOLOGY | Age: 78
End: 2023-06-27
Payer: MEDICARE

## 2023-06-27 DIAGNOSIS — I10 ESSENTIAL (PRIMARY) HYPERTENSION: ICD-10-CM

## 2023-06-27 DIAGNOSIS — C61 MALIGNANT NEOPLASM OF PROSTATE (HCC): ICD-10-CM

## 2023-06-27 DIAGNOSIS — R06.00 DYSPNEA, UNSPECIFIED TYPE: ICD-10-CM

## 2023-06-27 LAB
ANION GAP SERPL CALCULATED.3IONS-SCNC: 4 MMOL/L
BUN SERPL-MCNC: 17 MG/DL (ref 5–25)
CALCIUM SERPL-MCNC: 9.9 MG/DL (ref 8.3–10.1)
CHLORIDE SERPL-SCNC: 108 MMOL/L (ref 96–108)
CO2 SERPL-SCNC: 27 MMOL/L (ref 21–32)
CREAT SERPL-MCNC: 1 MG/DL (ref 0.6–1.3)
GFR SERPL CREATININE-BSD FRML MDRD: 71 ML/MIN/1.73SQ M
GLUCOSE SERPL-MCNC: 79 MG/DL (ref 65–140)
POTASSIUM SERPL-SCNC: 4.3 MMOL/L (ref 3.5–5.3)
SODIUM SERPL-SCNC: 139 MMOL/L (ref 135–147)

## 2023-06-27 PROCEDURE — 80048 BASIC METABOLIC PNL TOTAL CA: CPT | Performed by: INTERNAL MEDICINE

## 2023-06-27 PROCEDURE — 71046 X-RAY EXAM CHEST 2 VIEWS: CPT

## 2023-06-28 ENCOUNTER — APPOINTMENT (OUTPATIENT)
Dept: LAB | Facility: HOSPITAL | Age: 78
End: 2023-06-28
Payer: MEDICARE

## 2023-06-28 DIAGNOSIS — C61 PROSTATE CANCER (HCC): ICD-10-CM

## 2023-06-28 LAB — PSA SERPL-MCNC: 0.1 NG/ML (ref 0–4)

## 2023-06-28 PROCEDURE — 84153 ASSAY OF PSA TOTAL: CPT

## 2023-06-28 PROCEDURE — 36415 COLL VENOUS BLD VENIPUNCTURE: CPT

## 2023-07-17 ENCOUNTER — HOSPITAL ENCOUNTER (OUTPATIENT)
Dept: RADIOLOGY | Age: 78
Discharge: HOME | End: 2023-07-17
Payer: MEDICARE

## 2023-07-17 DIAGNOSIS — R51.9 NONINTRACTABLE EPISODIC HEADACHE, UNSPECIFIED HEADACHE TYPE: ICD-10-CM

## 2023-07-17 DIAGNOSIS — I77.71 CAROTID DISSECTION, BILATERAL (HCC): ICD-10-CM

## 2023-07-17 DIAGNOSIS — I65.29 STENOSIS OF CAROTID ARTERY, UNSPECIFIED LATERALITY: ICD-10-CM

## 2023-07-17 PROCEDURE — 70498 CT ANGIOGRAPHY NECK: CPT

## 2023-07-17 PROCEDURE — G1004 CDSM NDSC: HCPCS

## 2023-07-17 PROCEDURE — 70496 CT ANGIOGRAPHY HEAD: CPT

## 2023-07-17 RX ADMIN — IOHEXOL 85 ML: 350 INJECTION, SOLUTION INTRAVENOUS at 10:29
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
## 2023-07-24 ENCOUNTER — OFFICE VISIT (OUTPATIENT)
Dept: UROLOGY | Facility: AMBULATORY SURGERY CENTER | Age: 78
End: 2023-07-24
Payer: MEDICARE

## 2023-07-24 VITALS
WEIGHT: 186 LBS | DIASTOLIC BLOOD PRESSURE: 82 MMHG | HEART RATE: 75 BPM | SYSTOLIC BLOOD PRESSURE: 144 MMHG | BODY MASS INDEX: 25.19 KG/M2 | HEIGHT: 72 IN

## 2023-07-24 DIAGNOSIS — N39.3 STRESS INCONTINENCE: ICD-10-CM

## 2023-07-24 DIAGNOSIS — C61 PROSTATE CANCER (HCC): ICD-10-CM

## 2023-07-24 DIAGNOSIS — N39.0 URINARY TRACT INFECTION WITHOUT HEMATURIA, SITE UNSPECIFIED: Primary | ICD-10-CM

## 2023-07-24 DIAGNOSIS — D17.9 ANGIOMYOLIPOMA: ICD-10-CM

## 2023-07-24 LAB — POST-VOID RESIDUAL VOLUME, ML POC: 0 ML

## 2023-07-24 PROCEDURE — 99213 OFFICE O/P EST LOW 20 MIN: CPT | Performed by: NURSE PRACTITIONER

## 2023-07-24 PROCEDURE — 51798 US URINE CAPACITY MEASURE: CPT | Performed by: NURSE PRACTITIONER

## 2023-12-19 ENCOUNTER — ESTABLISHED COMPREHENSIVE EXAM (OUTPATIENT)
Dept: URBAN - METROPOLITAN AREA CLINIC 6 | Facility: CLINIC | Age: 78
End: 2023-12-19

## 2023-12-19 DIAGNOSIS — H43.813: ICD-10-CM

## 2023-12-19 DIAGNOSIS — H25.813: ICD-10-CM

## 2023-12-19 PROCEDURE — 92014 COMPRE OPH EXAM EST PT 1/>: CPT

## 2023-12-19 ASSESSMENT — TONOMETRY
OS_IOP_MMHG: 11
OD_IOP_MMHG: 10

## 2023-12-19 ASSESSMENT — VISUAL ACUITY
OS_CC: 20/25+1
OD_CC: 20/40

## 2024-01-10 ENCOUNTER — APPOINTMENT (OUTPATIENT)
Dept: LAB | Age: 79
End: 2024-01-10
Payer: MEDICARE

## 2024-01-10 DIAGNOSIS — C61 PROSTATE CANCER (HCC): ICD-10-CM

## 2024-01-10 LAB — PSA SERPL-MCNC: 0.13 NG/ML (ref 0–4)

## 2024-01-10 PROCEDURE — 84153 ASSAY OF PSA TOTAL: CPT

## 2024-01-10 PROCEDURE — 36415 COLL VENOUS BLD VENIPUNCTURE: CPT

## 2024-01-24 ENCOUNTER — OFFICE VISIT (OUTPATIENT)
Dept: UROLOGY | Facility: AMBULATORY SURGERY CENTER | Age: 79
End: 2024-01-24
Payer: MEDICARE

## 2024-01-24 VITALS
HEART RATE: 94 BPM | OXYGEN SATURATION: 98 % | WEIGHT: 186 LBS | HEIGHT: 72 IN | SYSTOLIC BLOOD PRESSURE: 140 MMHG | RESPIRATION RATE: 18 BRPM | BODY MASS INDEX: 25.19 KG/M2 | DIASTOLIC BLOOD PRESSURE: 82 MMHG

## 2024-01-24 DIAGNOSIS — D17.9 ANGIOMYOLIPOMA: Primary | ICD-10-CM

## 2024-01-24 DIAGNOSIS — C61 PROSTATE CANCER (HCC): ICD-10-CM

## 2024-01-24 PROCEDURE — 99214 OFFICE O/P EST MOD 30 MIN: CPT

## 2024-01-24 NOTE — Clinical Note
Howard Laguerre,   Sorry  to reach out when you are on call but patient requested that I speak to you in regards to plan as described in my note.  Please let me know if you have any further recommendations or alterations in the plan and I will inform patient.  Otherwise I will have him follow-up after obtain repeat PSA in approximately 6 to 8 weeks.  Thanks Kenny
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
## 2024-01-25 ENCOUNTER — TELEPHONE (OUTPATIENT)
Dept: UROLOGY | Facility: CLINIC | Age: 79
End: 2024-01-25

## 2024-01-25 NOTE — TELEPHONE ENCOUNTER
----- Message from Justice Laguerre MD sent at 1/25/2024  8:01 AM EST -----  You are managing this patient properly.  Short interval PSA in 2-3 mos is advise and then have patient see me for additional discussion.  If PSA keeps rising then next step is PSMA scan.  Thank you Kenny.  I have really seen great growth in your knowledge and clinical skills managing some really tough patients.  Keep up the great work.    FT    ----- Message -----  From: Kenny Pedroza PA-C  Sent: 1/24/2024  12:31 PM EST  To: Justice Laguerre MD    Hey Dr. Laguerre,      Sorry  to reach out when you are on call but patient requested that I speak to you in regards to plan as described in my note.  Please let me know if you have any further recommendations or alterations in the plan and I will inform patient.  Otherwise I will have him follow-up after obtain repeat PSA in approximately 6 to 8 weeks.    Thanks  Kenny

## 2024-01-25 NOTE — TELEPHONE ENCOUNTER
Please call patient to inform him that Dr. Laguerre agrees with plan as discussed in office tomorrow we will plan to get repeat PSA in approximately 2 months with a follow-up with Dr. Laguerre in approximately 3 months.  There is further rise in the PSA we will consider getting a PSMA PET scan this will be discussed at time of appointment Dr. Laguerre and is depending on what PSA does in the next couple weeks

## 2024-02-23 ENCOUNTER — OFFICE VISIT (OUTPATIENT)
Dept: OBGYN CLINIC | Facility: MEDICAL CENTER | Age: 79
End: 2024-02-23
Payer: MEDICARE

## 2024-02-23 VITALS
BODY MASS INDEX: 26.94 KG/M2 | DIASTOLIC BLOOD PRESSURE: 91 MMHG | HEART RATE: 73 BPM | SYSTOLIC BLOOD PRESSURE: 142 MMHG | WEIGHT: 198.6 LBS

## 2024-02-23 DIAGNOSIS — M48.061 DEGENERATIVE LUMBAR SPINAL STENOSIS: ICD-10-CM

## 2024-02-23 DIAGNOSIS — M70.72 ISCHIAL BURSITIS OF LEFT SIDE: Primary | ICD-10-CM

## 2024-02-23 DIAGNOSIS — M46.1 INFLAMMATION OF LEFT SACROILIAC JOINT (HCC): ICD-10-CM

## 2024-02-23 DIAGNOSIS — M70.62 TROCHANTERIC BURSITIS OF LEFT HIP: ICD-10-CM

## 2024-02-23 PROCEDURE — 99213 OFFICE O/P EST LOW 20 MIN: CPT | Performed by: ORTHOPAEDIC SURGERY

## 2024-02-23 PROCEDURE — 20610 DRAIN/INJ JOINT/BURSA W/O US: CPT | Performed by: ORTHOPAEDIC SURGERY

## 2024-02-23 RX ORDER — LIDOCAINE HYDROCHLORIDE 10 MG/ML
2 INJECTION, SOLUTION INFILTRATION; PERINEURAL
Status: COMPLETED | OUTPATIENT
Start: 2024-02-23 | End: 2024-02-23

## 2024-02-23 RX ORDER — BUPIVACAINE HYDROCHLORIDE 2.5 MG/ML
2 INJECTION, SOLUTION INFILTRATION; PERINEURAL
Status: COMPLETED | OUTPATIENT
Start: 2024-02-23 | End: 2024-02-23

## 2024-02-23 RX ORDER — BETAMETHASONE SODIUM PHOSPHATE AND BETAMETHASONE ACETATE 3; 3 MG/ML; MG/ML
12 INJECTION, SUSPENSION INTRA-ARTICULAR; INTRALESIONAL; INTRAMUSCULAR; SOFT TISSUE
Status: COMPLETED | OUTPATIENT
Start: 2024-02-23 | End: 2024-02-23

## 2024-02-23 RX ADMIN — BETAMETHASONE SODIUM PHOSPHATE AND BETAMETHASONE ACETATE 12 MG: 3; 3 INJECTION, SUSPENSION INTRA-ARTICULAR; INTRALESIONAL; INTRAMUSCULAR; SOFT TISSUE at 11:15

## 2024-02-23 RX ADMIN — LIDOCAINE HYDROCHLORIDE 2 ML: 10 INJECTION, SOLUTION INFILTRATION; PERINEURAL at 11:15

## 2024-02-23 RX ADMIN — BUPIVACAINE HYDROCHLORIDE 2 ML: 2.5 INJECTION, SOLUTION INFILTRATION; PERINEURAL at 11:15
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
## 2024-02-28 ENCOUNTER — EVALUATION (OUTPATIENT)
Dept: PHYSICAL THERAPY | Facility: REHABILITATION | Age: 79
End: 2024-02-28
Payer: MEDICARE

## 2024-02-28 DIAGNOSIS — M54.2 CERVICAL PAIN (NECK): ICD-10-CM

## 2024-02-28 DIAGNOSIS — M70.72 ISCHIAL BURSITIS OF LEFT SIDE: Primary | ICD-10-CM

## 2024-02-28 DIAGNOSIS — M70.62 TROCHANTERIC BURSITIS OF LEFT HIP: ICD-10-CM

## 2024-02-28 DIAGNOSIS — M48.061 DEGENERATIVE LUMBAR SPINAL STENOSIS: ICD-10-CM

## 2024-02-28 DIAGNOSIS — M46.1 INFLAMMATION OF LEFT SACROILIAC JOINT (HCC): ICD-10-CM

## 2024-02-28 PROCEDURE — 97161 PT EVAL LOW COMPLEX 20 MIN: CPT

## 2024-02-28 PROCEDURE — 97110 THERAPEUTIC EXERCISES: CPT

## 2024-02-28 NOTE — PROGRESS NOTES
PT Evaluation     Today's date: 2024  Patient name: Rustam Salcedo  : 1945  MRN: [id number]  Referring provider: Joe Aguilar,*  Dx:   Encounter Diagnosis     ICD-10-CM    1. Ischial bursitis of left side  M70.72 Ambulatory referral to Physical Therapy      2. Trochanteric bursitis of left hip  M70.62 Ambulatory referral to Physical Therapy      3. Degenerative lumbar spinal stenosis  M48.061 Ambulatory referral to Physical Therapy      4. Inflammation of left sacroiliac joint (HCC)  M46.1 Ambulatory referral to Physical Therapy      5. Cervical pain (neck)  M54.2           Start Time: 1200  Stop Time: 1300  Total time in clinic (min): 60 minutes    Assessment  Assessment details: Rustam Salcedo is a 78 y.o. male presenting with acute exacerbation of chronic lumbar and L hip pain as well as chronic R sided cervical pain.  Primary impairments include lumbar, L posterior hip, and R cervical pain with functional activities, proximal LLE weakness, cervical/hip/lumbar AROM dysfunction, deep neck flexor and paraspinal motor control dysfunction.  Educated pt on anatomy and physiology of diagnosis.  Will benefit from skilled PT interventions for community reintegration, ADL management/independence, return to sport/hobbies.  Provided pt with written home exercise program to be completed daily.    Impairments: abnormal coordination, abnormal muscle firing, abnormal muscle tone, abnormal or restricted ROM, activity intolerance, impaired physical strength, lacks appropriate home exercise program, pain with function, poor posture  and poor body mechanics  Functional limitations: lifting/carrying heavy objects, cervical mobility, prolonged walking, repeated lumbar movements  Symptom irritability: moderateUnderstanding of Dx/Px/POC: good   Prognosis: good    Goals    Short Term Goals:  In 4 weeks, the patient will:  1. Improve L hip abduction strength to at least 4+/5 MMT  2. Improve L hip extension  "strength to at least 4+/5 MMT  3. Supervision with HEP for self-care  4. Improve cervical retraction AROM to at least 25%    Long Term Goals:  In 8 weeks, the patient will:  1. Tolerate lifting 35+ lbs from the ground without aggravating symptoms  2. FOTO to greater than predicted value  3. Independent with HEP for self-care  4. Decrease cervical pain intensity to less than 3/10    Plan  Patient would benefit from: skilled physical therapy  Planned modality interventions: manual electrical stimulation  Planned therapy interventions: abdominal trunk stabilization, activity modification, balance, balance/weight bearing training, behavior modification, body mechanics training, community reintegration, coordination, fine motor coordination training, flexibility, functional ROM exercises, gait training, graded activity, graded exercise, graded motor, home exercise program, work reintegration, therapeutic training, therapeutic exercise, therapeutic activities, stretching, strengthening, self care, postural training, patient education, neuromuscular re-education, motor coordination training, massage, manual therapy, joint mobilization and ADL training  Duration in weeks: 8  Plan of Care beginning date: 2/28/2024  Plan of Care expiration date: 4/24/2024  Treatment plan discussed with: patient      Subjective    Pain Location: L proximal HS and gluteal region, midline lumbar, R sided UT region  Pain Intensity: Lumbar/hip - 4/10; cervical - 7-8/10  FAHAD: cervical from previous accident, lumbar/hip acute exacerbation of chronic pain  DOI: chronic pain  Aggravating Factors: prolonged walking, carrying heavy objects, cervical rotation to the left  Alleviating Factors: L ischial tuberosity CSI, exercises from previous bout of PT  Living Situation: independent ADLs  Constitutional S/S: denies paresthesias   Dominant Hand: R  Goals: \"less pain or better able to control it\"  PLOF: 5-6k steps per day, exercises 2-3x per week, " stationary bike 2-3x week      Objective       Postural Findings:     Head Position x Protracted   Neutral   Retracted   Scapular Position x Protracted   Neutral   Retracted   Thoracic Spine  x Inc Kyphosis  Neutral       Lumbar Spine   Inc Lordosis   Neutral x Dec Lordosis   Pelvis   Anterior Tilt  Neutral  x Posterior Tilt   Iliac Crest   L elevated x Neutral   R elevated   Feet   Pronated x Neutral   Supinated      Strength and ROM evaluated B from a regional biomechanical perspective and values relevant to this episode recorded in tables below.     ROM:   Joint / Motion  Right  Left    Lumbar Flexion  90%     Lumbar Extension  25%     Lumbar Sidebending  25% 25%   Lumbar rotation 50% 50%   Hip ER  30 30   Hip IR  20 20        Cervical flexion 75%    Cervical extension 15%    Cervical sidebending 40% 25%   Cervical rotation 25% 25%   Cervical retraction <5%    Cervical protraction 75%          Strength: MMT revealed the following findings.  Joint Motion Right Left   Hip Flexion 4+/5 4-/5   Hip Abduction 4+/5 4-/5   Hip Adduction 4+/5 4-/5   Hip Extension 4+/5 4-/5   Knee Extension 5/5 5/5   Knee Flexion 5/5 5/5   Ankle Plantarflexion 5/5 5/5   Ankle Dorsiflexion 5/5 5/5        Special Tests:  Lumbar Specific and Neural Tension  Test / Measure  2/28/2024   Prone instability test +       SI Joint Screen: unremarkable     Treatment Based Classification: Primary flexion responder; Secondary stability              Precautions: HTN, hx CA    POC expires Unit limit Auth Expiration date PT/OT + Visit Limit?   4/24/24 BOMN N/A BOMN     Visit/Unit Tracking  AUTH Status:  Date 2/28        RE every 10 v Used 1           Pertinent Findings:                                                                                            Test / Measure  2/28/2024   FOTO (Predicted 64) 61   L hip ext MMT 4-/5   L hip abd MMT 4-/5   Cerv retr AROM <5%         Manuals 2/28                                                               "  Neuro Re-Ed             Cerv retr 5x            Cerv iso 4 way             Bridges P!            LTRs 5x            Suitcase carry             TB side step             TB monster walk             Pallof press             Resisted trunk rot                                       Ther Ex             HEP review / edu 10'            NuStep 5' L8            R UT/LS S 30\" ea            Rows             Sh ext             Facepulls             Shrugs             Lying SLR 5x B flex only            Deadlift             Kickstand RDL             SL leg press             DL leg press                                       Ther Activity                                       Gait Training                                       Modalities                                            "

## 2024-03-01 ENCOUNTER — OFFICE VISIT (OUTPATIENT)
Dept: PHYSICAL THERAPY | Facility: REHABILITATION | Age: 79
End: 2024-03-01
Payer: MEDICARE

## 2024-03-01 DIAGNOSIS — M48.061 DEGENERATIVE LUMBAR SPINAL STENOSIS: ICD-10-CM

## 2024-03-01 DIAGNOSIS — M46.1 INFLAMMATION OF LEFT SACROILIAC JOINT (HCC): ICD-10-CM

## 2024-03-01 DIAGNOSIS — M70.72 ISCHIAL BURSITIS OF LEFT SIDE: Primary | ICD-10-CM

## 2024-03-01 DIAGNOSIS — M70.62 TROCHANTERIC BURSITIS OF LEFT HIP: ICD-10-CM

## 2024-03-01 DIAGNOSIS — M54.2 CERVICAL PAIN (NECK): ICD-10-CM

## 2024-03-01 PROCEDURE — 97112 NEUROMUSCULAR REEDUCATION: CPT

## 2024-03-01 PROCEDURE — 97110 THERAPEUTIC EXERCISES: CPT

## 2024-03-01 NOTE — PROGRESS NOTES
"Daily Note     Today's date: 3/1/2024  Patient name: Rustam Salcedo  : 1945  MRN: [id number]  Referring provider: Joe Aguilar,*  Dx:   Encounter Diagnosis     ICD-10-CM    1. Ischial bursitis of left side  M70.72       2. Trochanteric bursitis of left hip  M70.62       3. Degenerative lumbar spinal stenosis  M48.061       4. Inflammation of left sacroiliac joint (HCC)  M46.1       5. Cervical pain (neck)  M54.2           Start Time: 1005  Stop Time: 1105  Total time in clinic (min): 60 minutes    Subjective: Pt reports UT/cervical tightness from completing exercises at home.  States that his R hip actually felt weaker than L hip when performing sidelying hip abduction.        Objective: See treatment diary below      Assessment: Tolerated treatment well. Patient would benefit from continued PT.  Pt able to tolerate performance of planned therapeutic interventions this tx session.  Challenged with current POC - will attempt to progress as able.  No pain aggravation throughout tx session, moderate fatigue post-session.  1:1 with Jomar Lopez DPT entirety of tx.        Plan: Continue per plan of care.      Precautions: HTN, hx CA    POC expires Unit limit Auth Expiration date PT/OT + Visit Limit?   24 BOMN N/A BOMN     Visit/Unit Tracking  AUTH Status:  Date 2/28 3/1       RE every 10 v Used 1 2          Pertinent Findings:                                                                                            Test / Measure  2024   FOTO (Predicted 64) 61   L hip ext MMT 4-/5   L hip abd MMT 4-/5   Cerv retr AROM <5%         Manuals 2/28 3/1                       Neuro Re-Ed     Cerv retr 5x    Cerv iso 4 way     Bridges P!    LTRs 5x    Suitcase carry     TB side step  3 laps mirror btb   TB monster walk  3 laps mirror btb   Pallof press  2x10 dbl btb   Resisted trunk rot               Ther Ex     HEP review / edu 10'    NuStep 5' L8 8' L8   R UT/LS S 30\" ea    Rows  3x10 mtb   Sh " ext  3x10 mtb   Facepulls     Shrugs  2x10 10# + rot   Lying SLR 5x B flex only    Stand hip 3 way  15x ea B   Deadlift     Kickstand RDL  2x5 B 20#   SL leg press  10x B 40#  10x B 55#  5x B 70#   DL leg press  2x10 145#             Ther Activity               Gait Training               Modalities

## 2024-03-06 ENCOUNTER — OFFICE VISIT (OUTPATIENT)
Dept: PHYSICAL THERAPY | Facility: REHABILITATION | Age: 79
End: 2024-03-06
Payer: MEDICARE

## 2024-03-06 DIAGNOSIS — M48.061 DEGENERATIVE LUMBAR SPINAL STENOSIS: ICD-10-CM

## 2024-03-06 DIAGNOSIS — M70.72 ISCHIAL BURSITIS OF LEFT SIDE: Primary | ICD-10-CM

## 2024-03-06 DIAGNOSIS — M70.62 TROCHANTERIC BURSITIS OF LEFT HIP: ICD-10-CM

## 2024-03-06 DIAGNOSIS — M54.2 CERVICAL PAIN (NECK): ICD-10-CM

## 2024-03-06 DIAGNOSIS — M46.1 INFLAMMATION OF LEFT SACROILIAC JOINT (HCC): ICD-10-CM

## 2024-03-06 PROCEDURE — 97112 NEUROMUSCULAR REEDUCATION: CPT

## 2024-03-06 PROCEDURE — 97110 THERAPEUTIC EXERCISES: CPT

## 2024-03-06 NOTE — PROGRESS NOTES
"Daily Note     Today's date: 3/6/2024  Patient name: Rustam Salcedo  : 1945  MRN: [id number]  Referring provider: Joe Aguilar,*  Dx:   Encounter Diagnosis     ICD-10-CM    1. Ischial bursitis of left side  M70.72       2. Trochanteric bursitis of left hip  M70.62       3. Degenerative lumbar spinal stenosis  M48.061       4. Inflammation of left sacroiliac joint (HCC)  M46.1       5. Cervical pain (neck)  M54.2           Start Time: 1200  Stop Time: 1300  Total time in clinic (min): 60 minutes    Subjective: Pt reports slight improvement of symptoms, but he does note mid trap discomfort/soreness/tightness.      Objective: See treatment diary below      Assessment: Tolerated treatment well. Patient would benefit from continued PT.  Added in mid trap stretching to address subjective concerns - partial alleviation of symptoms.  Mild fatigue post-session.  VC's provided to perform exercises correctly and safely.  No aggravation of pain during and after tx session.  1:1 with Jomar Lopez DPT entirety of tx.        Plan: Continue per plan of care.      Precautions: HTN, hx CA    POC expires Unit limit Auth Expiration date PT/OT + Visit Limit?   24 BOMN N/A BOMN     Visit/Unit Tracking  AUTH Status:  Date 2/28 3/1 3/6      RE every 10 v Used 1 2 3         Pertinent Findings:                                                                                            Test / Measure  2024   FOTO (Predicted 64) 61   L hip ext MMT 4-/5   L hip abd MMT 4-/5   Cerv retr AROM <5%         Manuals 2/28 3/1 3/6                           Neuro Re-Ed      Cerv retr 5x     Cerv flex/ext + posterior resistance UE's   15x ea   Bridges P!     LTRs 5x     Suitcase carry   + [place] 9\" step 15x B w/ contra UE hold 25#   TB side step  3 laps mirror btb    TB monster walk  3 laps mirror btb    Pallof press  2x10 dbl btb + rot 10x B 9#   Resisted trunk rot      [place] resist retro walk   15x 20# + row iso         Ther " "Ex      HEP review / edu 10'     NuStep 5' L8 8' L8 8' L9   R UT/LS S 30\" ea     Rows  3x10 mtb    Sh ext  3x10 mtb    Facepulls      Shrugs  2x10 10# + rot 2x10 10# + rot   Lying SLR 5x B flex only     Stand hip 3 way  15x ea B    Deadlift   3x6 25#   Kickstand RDL  2x5 B 20#    SL leg press  10x B 40#  10x B 55#  5x B 70# 10x B 55#  10x B 70#  10x B 85#   DL leg press  2x10 145# 10x 100#  10x 115#  10x 130#  10x 145#   Mid trap S - hold rack   4x30\"         Ther Activity                  Gait Training                  Modalities                         "

## 2024-03-08 ENCOUNTER — OFFICE VISIT (OUTPATIENT)
Dept: PHYSICAL THERAPY | Facility: REHABILITATION | Age: 79
End: 2024-03-08
Payer: MEDICARE

## 2024-03-08 DIAGNOSIS — M70.62 TROCHANTERIC BURSITIS OF LEFT HIP: ICD-10-CM

## 2024-03-08 DIAGNOSIS — M48.061 DEGENERATIVE LUMBAR SPINAL STENOSIS: ICD-10-CM

## 2024-03-08 DIAGNOSIS — M54.2 CERVICAL PAIN (NECK): ICD-10-CM

## 2024-03-08 DIAGNOSIS — M46.1 INFLAMMATION OF LEFT SACROILIAC JOINT (HCC): ICD-10-CM

## 2024-03-08 DIAGNOSIS — M70.72 ISCHIAL BURSITIS OF LEFT SIDE: Primary | ICD-10-CM

## 2024-03-08 PROCEDURE — 97110 THERAPEUTIC EXERCISES: CPT

## 2024-03-08 PROCEDURE — 97112 NEUROMUSCULAR REEDUCATION: CPT

## 2024-03-08 NOTE — PROGRESS NOTES
"Daily Note     Today's date: 3/8/2024  Patient name: Rustam Salcedo  : 1945  MRN: [id number]  Referring provider: Joe Aguilar,*  Dx:   Encounter Diagnosis     ICD-10-CM    1. Ischial bursitis of left side  M70.72       2. Trochanteric bursitis of left hip  M70.62       3. Degenerative lumbar spinal stenosis  M48.061       4. Inflammation of left sacroiliac joint (HCC)  M46.1       5. Cervical pain (neck)  M54.2           Start Time: 1115  Stop Time: 1200  Total time in clinic (min): 45 minutes    Subjective: Pt reports mild soreness following last tx session.        Objective: See treatment diary below      Assessment: Tolerated treatment well. Patient would benefit from continued PT.  Pt able to tolerate continuation of planned therapeutic  interventions this visit.  Activity tolerance remains slightly limited, but he is able to complete all exercises correctly and safely.  Hip and lumbar strength/motor control is improving.  1:1 with Jomar Lopez DPT entirety of tx.        Plan: Continue per plan of care.      Precautions: HTN, hx CA    POC expires Unit limit Auth Expiration date PT/OT + Visit Limit?   24 BOMN N/A BOMN     Visit/Unit Tracking  AUTH Status:  Date 2/28 3/1 3/6 3/8     RE every 10 v Used 1 2 3 4        Pertinent Findings:                                                                                            Test / Measure  2024   FOTO (Predicted 64) 61   L hip ext MMT 4-/5   L hip abd MMT 4-/5   Cerv retr AROM <5%         Manuals 2/28 3/1 3/6 3/8                               Neuro Re-Ed       Cerv retr 5x      Cerv flex/ext + posterior resistance UE's   15x ea    Bridges P!      LTRs 5x      Suitcase carry   + [employer] 9\" step 15x B w/ contra UE hold 25# + [employer] 9\" step 15x B w/ contra UE hold 25#   TB side step  3 laps mirror btb     TB monster walk  3 laps mirror btb     Pallof press  2x10 dbl btb + rot 10x B 9# + rot 10x B 9#   Resisted trunk rot       Piter resist " "retro walk   15x 20# + row iso 15x 20# + row iso          Ther Ex       HEP review / edu 10'      NuStep 5' L8 8' L8 8' L9 8' L9   R UT/LS S 30\" ea      Rows  3x10 mtb     Sh ext  3x10 mtb     Facepulls       Shrugs  2x10 10# + rot 2x10 10# + rot 2x10 10# + rot   Lying SLR 5x B flex only      Stand hip 3 way  15x ea B     Deadlift   3x6 25# 3x8 25#   Kickstand RDL  2x5 B 20#     SL leg press  10x B 40#  10x B 55#  5x B 70# 10x B 55#  10x B 70#  10x B 85# 10x B 70#  10x B 85#  10x B 100#   DL leg press  2x10 145# 10x 100#  10x 115#  10x 130#  10x 145# 10x 145#  10x 160#  10x 190#   Mid trap S - hold rack   4x30\" 4x30\"          Ther Activity                     Gait Training                     Modalities                              "

## 2024-03-12 ENCOUNTER — OFFICE VISIT (OUTPATIENT)
Dept: PHYSICAL THERAPY | Facility: REHABILITATION | Age: 79
End: 2024-03-12
Payer: MEDICARE

## 2024-03-12 DIAGNOSIS — M70.62 TROCHANTERIC BURSITIS OF LEFT HIP: ICD-10-CM

## 2024-03-12 DIAGNOSIS — M48.061 DEGENERATIVE LUMBAR SPINAL STENOSIS: ICD-10-CM

## 2024-03-12 DIAGNOSIS — M70.72 ISCHIAL BURSITIS OF LEFT SIDE: Primary | ICD-10-CM

## 2024-03-12 DIAGNOSIS — M46.1 INFLAMMATION OF LEFT SACROILIAC JOINT (HCC): ICD-10-CM

## 2024-03-12 DIAGNOSIS — M54.2 CERVICAL PAIN (NECK): ICD-10-CM

## 2024-03-12 PROCEDURE — 97110 THERAPEUTIC EXERCISES: CPT

## 2024-03-12 PROCEDURE — 97112 NEUROMUSCULAR REEDUCATION: CPT

## 2024-03-12 NOTE — PROGRESS NOTES
"Daily Note     Today's date: 3/12/2024  Patient name: Rustam Salcedo  : 1945  MRN: [id number]  Referring provider: Joe Aguilar,*  Dx:   Encounter Diagnosis     ICD-10-CM    1. Ischial bursitis of left side  M70.72       2. Trochanteric bursitis of left hip  M70.62       3. Degenerative lumbar spinal stenosis  M48.061       4. Inflammation of left sacroiliac joint (HCC)  M46.1       5. Cervical pain (neck)  M54.2           Start Time: 940  Stop Time: 1018  Total time in clinic (min): 38 minutes    Subjective: Pt reports mild lumbar discomfort/soreness prior to start of tx session.  States that he is compliant with HEP.  Was bringing chopped wood upstairs yesterday.      Objective: See treatment diary below      Assessment: Tolerated treatment well. Patient would benefit from continued PT.  Pt able to tolerate gradual progression of planned therapeutic interventions this visit.  Mild fatigue noted post-session.  VC's and demonstration provided to perform interventions correctly and safely.  Mild soreness persists post-session.  1:1 with Jomar Lopez DPT entirety of tx.        Plan: Continue per plan of care.      Precautions: HTN, hx CA    POC expires Unit limit Auth Expiration date PT/OT + Visit Limit?   24 BOMN N/A BOMN     Visit/Unit Tracking  AUTH Status:  Date 2/28 3/1 3/6 3/8 3/12    RE every 10 v Used 1 2 3 4 5       Pertinent Findings:                                                                                            Test / Measure  2024   FOTO (Predicted 64) 61   L hip ext MMT 4-/5   L hip abd MMT 4-/5   Cerv retr AROM <5%         Manuals 2/28 3/1 3/6 3/8 3/12                                   Neuro Re-Ed        Cerv retr 5x       Cerv flex/ext + posterior resistance UE's   15x ea     Bridges P!       LTRs 5x       Suitcase carry   + [employer] 9\" step 15x B w/ contra UE hold 25# + [employer] 9\" step 15x B w/ contra UE hold 25# + static march 15x alt LE B 25# + 5# aw's   TB side " "step  3 laps mirror btb   3 laps thin black band   TB monster walk  3 laps mirror btb      Pallof press  2x10 dbl btb + rot 10x B 9# + rot 10x B 9#    Resisted trunk rot        [place] resist retro walk   15x 20# + row iso 15x 20# + row iso            Ther Ex        HEP review / edu 10'       NuStep 5' L8 8' L8 8' L9 8' L9 8' L9   R UT/LS S 30\" ea       Rows  3x10 mtb      Sh ext  3x10 mtb      Facepulls        Shrugs  2x10 10# + rot 2x10 10# + rot 2x10 10# + rot    Lying SLR 5x B flex only       Stand hip 3 way  15x ea B      Deadlift   3x6 25# 3x8 25# X-DL  2x5 B 25#   Kickstand RDL  2x5 B 20#      SL leg press  10x B 40#  10x B 55#  5x B 70# 10x B 55#  10x B 70#  10x B 85# 10x B 70#  10x B 85#  10x B 100# 15x B 85#  10x B 100#   DL leg press  2x10 145# 10x 100#  10x 115#  10x 130#  10x 145# 10x 145#  10x 160#  10x 190# 15x 175#  10x 190#  10x 205#   Mid trap S - hold rack   4x30\" 4x30\" 4x30\"           Ther Activity                        Gait Training                        Modalities                                   "

## 2024-03-15 ENCOUNTER — OFFICE VISIT (OUTPATIENT)
Dept: PHYSICAL THERAPY | Facility: REHABILITATION | Age: 79
End: 2024-03-15
Payer: MEDICARE

## 2024-03-15 DIAGNOSIS — M54.2 CERVICAL PAIN (NECK): ICD-10-CM

## 2024-03-15 DIAGNOSIS — M70.72 ISCHIAL BURSITIS OF LEFT SIDE: Primary | ICD-10-CM

## 2024-03-15 DIAGNOSIS — M70.62 TROCHANTERIC BURSITIS OF LEFT HIP: ICD-10-CM

## 2024-03-15 DIAGNOSIS — M46.1 INFLAMMATION OF LEFT SACROILIAC JOINT (HCC): ICD-10-CM

## 2024-03-15 DIAGNOSIS — M48.061 DEGENERATIVE LUMBAR SPINAL STENOSIS: ICD-10-CM

## 2024-03-15 PROCEDURE — 97112 NEUROMUSCULAR REEDUCATION: CPT

## 2024-03-15 PROCEDURE — 97110 THERAPEUTIC EXERCISES: CPT

## 2024-03-15 NOTE — PROGRESS NOTES
"Daily Note     Today's date: 3/15/2024  Patient name: Rustam Salcedo  : 1945  MRN: [id number]  Referring provider: Joe Aguilar,*  Dx:   Encounter Diagnosis     ICD-10-CM    1. Ischial bursitis of left side  M70.72       2. Trochanteric bursitis of left hip  M70.62       3. Degenerative lumbar spinal stenosis  M48.061       4. Inflammation of left sacroiliac joint (HCC)  M46.1       5. Cervical pain (neck)  M54.2           Start Time: 0950  Stop Time: 1030  Total time in clinic (min): 40 minutes    Subjective: Pt reports improvement of cervical and hip discomfort recently.  Has been compliant with HEP.        Objective: See treatment diary below      Assessment: Tolerated treatment well. Patient would benefit from continued PT.  Pt able to tolerate continuation of POC addressing all diagnoses.  Mild fatigue noted post-session.  No aggravation of pain symptoms during and after tx session.  1:1 with Jomar Lopez DPT entirety of tx.        Plan: Continue per plan of care.      Precautions: HTN, hx CA    POC expires Unit limit Auth Expiration date PT/OT + Visit Limit?   24 BOMN N/A BOMN     Visit/Unit Tracking  AUTH Status:  Date 2/28 3/1 3/6 3/8 3/12 3/15   RE every 10 v Used 1 2 3 4 5 6      Pertinent Findings:                                                                                            Test / Measure  2024   FOTO (Predicted 64) 61   L hip ext MMT 4-/5   L hip abd MMT 4-/5   Cerv retr AROM <5%         Manuals 2/28 3/1 3/6 3/8 3/12 3/15                                       Neuro Re-Ed         Cerv retr 5x        Cerv flex/ext + posterior resistance UE's   15x ea      Bridges P!        LTRs 5x        Suitcase carry   + [employer] 9\" step 15x B w/ contra UE hold 25# + [employer] 9\" step 15x B w/ contra UE hold 25# + static march 15x alt LE B 25# + 5# aw's + static march 15x alt LE B 25# + 5# aw's   TB side step  3 laps mirror btb   3 laps thin black band 3 laps thin black band   TB monster " "walk  3 laps mirror btb       Pallof press  2x10 dbl btb + rot 10x B 9# + rot 10x B 9#     Resisted trunk rot         Piter resist retro walk   15x 20# + row iso 15x 20# + row iso     [employer] step ups + LPD      15x alt LE 6\" step, btb   Ther Ex         HEP review / edu 10'        NuStep 5' L8 8' L8 8' L9 8' L9 8' L9 8' L9   R UT/LS S 30\" ea        Rows  3x10 mtb       Sh ext  3x10 mtb       Facepulls         Shrugs  2x10 10# + rot 2x10 10# + rot 2x10 10# + rot  2x10 3\" hold 12#   Lying SLR 5x B flex only        Stand hip 3 way  15x ea B       Deadlift   3x6 25# 3x8 25# X-DL  2x5 B 25# X-DL  2x5 B 25#   Kickstand RDL  2x5 B 20#       SL leg press  10x B 40#  10x B 55#  5x B 70# 10x B 55#  10x B 70#  10x B 85# 10x B 70#  10x B 85#  10x B 100# 15x B 85#  10x B 100# 2x10 B 100#   DL leg press  2x10 145# 10x 100#  10x 115#  10x 130#  10x 145# 10x 145#  10x 160#  10x 190# 15x 175#  10x 190#  10x 205# 3x10 205#   Mid trap S - hold rack   4x30\" 4x30\" 4x30\" 4x30\"            Ther Activity                           Gait Training                           Modalities                                        "

## 2024-03-19 ENCOUNTER — OFFICE VISIT (OUTPATIENT)
Dept: PHYSICAL THERAPY | Facility: REHABILITATION | Age: 79
End: 2024-03-19
Payer: MEDICARE

## 2024-03-19 DIAGNOSIS — M48.061 DEGENERATIVE LUMBAR SPINAL STENOSIS: ICD-10-CM

## 2024-03-19 DIAGNOSIS — M70.62 TROCHANTERIC BURSITIS OF LEFT HIP: ICD-10-CM

## 2024-03-19 DIAGNOSIS — M54.2 CERVICAL PAIN (NECK): ICD-10-CM

## 2024-03-19 DIAGNOSIS — M46.1 INFLAMMATION OF LEFT SACROILIAC JOINT (HCC): ICD-10-CM

## 2024-03-19 DIAGNOSIS — M70.72 ISCHIAL BURSITIS OF LEFT SIDE: Primary | ICD-10-CM

## 2024-03-19 PROCEDURE — 97112 NEUROMUSCULAR REEDUCATION: CPT

## 2024-03-19 PROCEDURE — 97110 THERAPEUTIC EXERCISES: CPT

## 2024-03-19 NOTE — PROGRESS NOTES
Daily Note     Today's date: 3/19/2024  Patient name: Rustam Salcedo  : 1945  MRN: [id number]  Referring provider: Joe Aguilar,*  Dx:   Encounter Diagnosis     ICD-10-CM    1. Ischial bursitis of left side  M70.72       2. Trochanteric bursitis of left hip  M70.62       3. Degenerative lumbar spinal stenosis  M48.061       4. Inflammation of left sacroiliac joint (HCC)  M46.1       5. Cervical pain (neck)  M54.2           Start Time: 1100  Stop Time: 1200  Total time in clinic (min): 60 minutes    Subjective: Pt reports continued cervical tightness and soreness.  He states that there has been a slight improvement of cervical status since starting PT and HEP.  Pt notes moderate improvement of lumbar/hip dysfunction, but he continues to have soreness and discomfort with prolonged physical activity.        Objective: See treatment diary below      Assessment: Tolerated treatment well. Patient would benefit from continued PT.  Good tolerance to continued progression of planned therapeutic interventions this tx session.  Alternated exercises between cervical-lumbar-hip targeted exercises.  Mild fatigue post-session.  Functionality is gradually improving.  1:1 with Jomar Lopez DPT entirety of tx.        Plan: Continue per plan of care.      Precautions: HTN, hx CA    POC expires Unit limit Auth Expiration date PT/OT + Visit Limit?   24 BOMN N/A BOMN     Visit/Unit Tracking  AUTH Status:  Date 2/28 3/1 3/6 3/8 3/12 3/15 3/19   RE every 10 v Used 1 2 3 4 5 6 7      Pertinent Findings:                                                                                            Test / Measure  2024   FOTO (Predicted 64) 61   L hip ext MMT 4-/5   L hip abd MMT 4-/5   Cerv retr AROM <5%         Manuals 2/28 3/1 3/6 3/8 3/12 3/15 3/19                                           Neuro Re-Ed          Cerv retr 5x         Cerv flex/ext + posterior resistance UE's   15x ea       Bridges P!         LTRs  "5x         Suitcase carry   + [employer] 9\" step 15x B w/ contra UE hold 25# + [employer] 9\" step 15x B w/ contra UE hold 25# + static march 15x alt LE B 25# + 5# aw's + static march 15x alt LE B 25# + 5# aw's [id number] 35#   TB side step  3 laps mirror btb   3 laps thin black band 3 laps thin black band    TB monster walk  3 laps mirror btb        Pallof press  2x10 dbl btb + rot 10x B 9# + rot 10x B 9#      Piter resist retro walk   15x 20# + row iso 15x 20# + row iso      [employer] step ups + LPD      15x alt LE 6\" step, btb    [place]       2x10   Piter diag chops       10x B 12#   [place] diag lifts       10x B 10#   Cerv iso 4 way       5x5\" ea   Ther Ex          HEP review / edu 10'         NuStep 5' L8 8' L8 8' L9 8' L9 8' L9 8' L9 8' L10   R UT/LS S 30\" ea         Rows  3x10 mtb        Sh ext  3x10 mtb        Facepulls          Shrugs  2x10 10# + rot 2x10 10# + rot 2x10 10# + rot  2x10 3\" hold 12# 2x10 3\" hold 12#   Lying SLR 5x B flex only         Stand hip 3 way  15x ea B     [place] 4 way 10x ea B 4#   Deadlift   3x6 25# 3x8 25# X-DL  2x5 B 25# X-DL  2x5 B 25#    Kickstand RDL  2x5 B 20#        SL leg press  10x B 40#  10x B 55#  5x B 70# 10x B 55#  10x B 70#  10x B 85# 10x B 70#  10x B 85#  10x B 100# 15x B 85#  10x B 100# 2x10 B 100# 2x10 B 100#   DL leg press  2x10 145# 10x 100#  10x 115#  10x 130#  10x 145# 10x 145#  10x 160#  10x 190# 15x 175#  10x 190#  10x 205# 3x10 205# 3x10 205#   Mid trap S - hold rack   4x30\" 4x30\" 4x30\" 4x30\"              Ther Activity                              Gait Training                              Modalities                                             "

## 2024-03-22 ENCOUNTER — OFFICE VISIT (OUTPATIENT)
Dept: PHYSICAL THERAPY | Facility: REHABILITATION | Age: 79
End: 2024-03-22
Payer: MEDICARE

## 2024-03-22 DIAGNOSIS — M48.061 DEGENERATIVE LUMBAR SPINAL STENOSIS: ICD-10-CM

## 2024-03-22 DIAGNOSIS — M54.2 CERVICAL PAIN (NECK): ICD-10-CM

## 2024-03-22 DIAGNOSIS — M46.1 INFLAMMATION OF LEFT SACROILIAC JOINT (HCC): ICD-10-CM

## 2024-03-22 DIAGNOSIS — M70.62 TROCHANTERIC BURSITIS OF LEFT HIP: ICD-10-CM

## 2024-03-22 DIAGNOSIS — M70.72 ISCHIAL BURSITIS OF LEFT SIDE: Primary | ICD-10-CM

## 2024-03-22 PROCEDURE — 97112 NEUROMUSCULAR REEDUCATION: CPT

## 2024-03-22 PROCEDURE — 97110 THERAPEUTIC EXERCISES: CPT

## 2024-03-22 NOTE — PROGRESS NOTES
"Daily Note     Today's date: 3/22/2024  Patient name: Rustam Salcedo  : 1945  MRN: [id number]  Referring provider: Joe Aguilar,*  Dx:   Encounter Diagnosis     ICD-10-CM    1. Ischial bursitis of left side  M70.72       2. Trochanteric bursitis of left hip  M70.62       3. Degenerative lumbar spinal stenosis  M48.061       4. Inflammation of left sacroiliac joint (HCC)  M46.1       5. Cervical pain (neck)  M54.2           Start Time: 0945  Stop Time: 1030  Total time in clinic (min): 45 minutes    Subjective: Pt reports gradual improvement of symptoms but does note significant difficulty performing R Sh abduction with resistance band especially with FA fully pronated.        Objective: See treatment diary below      Assessment: Tolerated treatment well. Patient would benefit from continued PT.  Discussed potential cause for difficulty for R Sh elevation due to supraspinatus involvement.  Challenged with current POC addressing hip, lumbar, and cervical dysfunction.  Mild fatigue post-session.  Scapular motor control deficit persists.  1:1 with Jomar Lopez DPT entirety of tx.        Plan: Continue per plan of care.      Precautions: HTN, hx CA    POC expires Unit limit Auth Expiration date PT/OT + Visit Limit?   24 BOMN N/A BOMN     Visit/Unit Tracking  AUTH Status:  Date 3/1 3/6 3/8 3/12 3/15 3/19 3/22   RE every 10 v Used 2 3 4 5 6 7 8      Pertinent Findings:                                                                                            Test / Measure  2024   FOTO (Predicted 64) 61   L hip ext MMT 4-/5   L hip abd MMT 4-/5   Cerv retr AROM <5%         Manuals 3/1 3/6 3/8 3/12 3/15 3/19 3/22                                           Neuro Re-Ed          Cerv retr          Cerv flex/ext + posterior resistance UE's  15x ea        Bridges          LTRs          Suitcase carry  + [employer] 9\" step 15x B w/ contra UE hold 25# + [employer] 9\" step 15x B w/ contra UE hold 25# + static march " "15x alt LE B 25# + 5# aw's + static march 15x alt LE B 25# + 5# aw's [id number] B 35# [id number] B 35#   TB side step 3 laps mirror btb   3 laps thin black band 3 laps thin black band     TB monster walk 3 laps mirror btb         Pallof press 2x10 dbl btb + rot 10x B 9# + rot 10x B 9#       Piter resist retro walk  15x 20# + row iso 15x 20# + row iso       Runner's step ups + LPD     15x alt LE 6\" step, btb     Wall angels      2x10    Piter diag chops      10x B 12# 10x B 13#   [place] diag lifts      10x B 10# 10x B 10#   Cerv iso 4 way      5x5\" ea    Ther Ex          HEP review / edu          NuStep 8' L8 8' L9 8' L9 8' L9 8' L9 8' L10 8' L10   R UT/LS S          Rows 3x10 mtb         Sh ext 3x10 mtb         Facepulls          Shrugs 2x10 10# + rot 2x10 10# + rot 2x10 10# + rot  2x10 3\" hold 12# 2x10 3\" hold 12#    Lying SLR          Stand hip 3 way 15x ea B     Piter 4 way 10x ea B 4# Piter 4 way 15x ea B 4#   Deadlift  3x6 25# 3x8 25# X-DL  2x5 B 25# X-DL  2x5 B 25#     Kickstand RDL 2x5 B 20#         SL leg press 10x B 40#  10x B 55#  5x B 70# 10x B 55#  10x B 70#  10x B 85# 10x B 70#  10x B 85#  10x B 100# 15x B 85#  10x B 100# 2x10 B 100# 2x10 B 100# 2x10 B 115#   DL leg press 2x10 145# 10x 100#  10x 115#  10x 130#  10x 145# 10x 145#  10x 160#  10x 190# 15x 175#  10x 190#  10x 205# 3x10 205# 3x10 205# 3x10 205#   Mid trap S - hold rack  4x30\" 4x30\" 4x30\" 4x30\"     DB Sh flex into abd and back       10x ea 5#   Ther Activity                              Gait Training                              Modalities                                               "

## 2024-03-26 ENCOUNTER — OFFICE VISIT (OUTPATIENT)
Dept: PHYSICAL THERAPY | Facility: REHABILITATION | Age: 79
End: 2024-03-26
Payer: MEDICARE

## 2024-03-26 DIAGNOSIS — M70.72 ISCHIAL BURSITIS OF LEFT SIDE: Primary | ICD-10-CM

## 2024-03-26 DIAGNOSIS — M54.2 CERVICAL PAIN (NECK): ICD-10-CM

## 2024-03-26 DIAGNOSIS — M70.62 TROCHANTERIC BURSITIS OF LEFT HIP: ICD-10-CM

## 2024-03-26 DIAGNOSIS — M46.1 INFLAMMATION OF LEFT SACROILIAC JOINT (HCC): ICD-10-CM

## 2024-03-26 DIAGNOSIS — M48.061 DEGENERATIVE LUMBAR SPINAL STENOSIS: ICD-10-CM

## 2024-03-26 PROCEDURE — 97110 THERAPEUTIC EXERCISES: CPT

## 2024-03-26 PROCEDURE — 97112 NEUROMUSCULAR REEDUCATION: CPT

## 2024-03-26 NOTE — PROGRESS NOTES
"Daily Note     Today's date: 3/26/2024  Patient name: Rustam Salcedo  : 1945  MRN: [id number]  Referring provider: Joe Aguilar,*  Dx:   Encounter Diagnosis     ICD-10-CM    1. Ischial bursitis of left side  M70.72       2. Trochanteric bursitis of left hip  M70.62       3. Degenerative lumbar spinal stenosis  M48.061       4. Inflammation of left sacroiliac joint (HCC)  M46.1       5. Cervical pain (neck)  M54.2                      Subjective:  Patient states he is doing well.       Objective: See treatment diary below      Assessment: Tolerated treatment well. Continued POC. Patient demonstrated fatigue post treatment, exhibited good technique with therapeutic exercises, and would benefit from continued PT      Plan: Continue per plan of care.      Precautions: HTN, hx CA    POC expires Unit limit Auth Expiration date PT/OT + Visit Limit?   24 BOMN N/A BOMN     Visit/Unit Tracking  AUTH Status:  Date 3/1 3/6 3/8 3/12 3/15 3/19 3/22   RE every 10 v Used 2 3 4 5 6 7 8      Pertinent Findings:                                                                                            Test / Measure  2024   FOTO (Predicted 64) 61   L hip ext MMT 4-/5   L hip abd MMT 4-/5   Cerv retr AROM <5%         Manuals 3/26 3/6 3/8 3/12 3/15 3/19 3/22                                           Neuro Re-Ed          Cerv retr          Cerv flex/ext + posterior resistance UE's  15x ea        Bridges          LTRs          Suitcase carry [id number] B 35# + [employer] 9\" step 15x B w/ contra UE hold 25# + [employer] 9\" step 15x B w/ contra UE hold 25# + static march 15x alt LE B 25# + 5# aw's + static march 15x alt LE B 25# + 5# aw's [id number] B 35# [id number] B 35#   TB side step    3 laps thin black band 3 laps thin black band     TB monster walk          Pallof press  + rot 10x B 9# + rot 10x B 9#       Piter resist retro walk  15x 20# + row iso 15x 20# + row iso       Runner's step ups + LPD     15x alt LE 6\" step, btb   " "  Wall angels      2x10    [place] diag chops 10x B 13#      10x B 12# 10x B 13#   [place] diag lifts 10x B 13#     10x B 10# 10x B 10#   Cerv iso 4 way      5x5\" ea    Ther Ex          HEP review / edu          NuStep 8' 10  8' L9 8' L9 8' L9 8' L9 8' L10 8' L10   R UT/LS S          Rows          Sh ext          Facepulls          Shrugs 2x10 hold 12# 2x10 10# + rot 2x10 10# + rot  2x10 3\" hold 12# 2x10 3\" hold 12#    Lying SLR          Stand hip 3 way [place] 4 way 15x ea B 4#     Piter 4 way 10x ea B 4# Piter 4 way 15x ea B 4#   Deadlift  3x6 25# 3x8 25# X-DL  2x5 B 25# X-DL  2x5 B 25#     Kickstand RDL          SL leg press 2x10 B 115# 10x B 55#  10x B 70#  10x B 85# 10x B 70#  10x B 85#  10x B 100# 15x B 85#  10x B 100# 2x10 B 100# 2x10 B 100# 2x10 B 115#   DL leg press 3x10 205#  10x 100#  10x 115#  10x 130#  10x 145# 10x 145#  10x 160#  10x 190# 15x 175#  10x 190#  10x 205# 3x10 205# 3x10 205# 3x10 205#   Mid trap S - hold rack  4x30\" 4x30\" 4x30\" 4x30\"     DB Sh flex into abd and back 10x ea 5#      10x ea 5#   Ther Activity                              Gait Training                              Modalities                                                 "

## 2024-03-29 ENCOUNTER — OFFICE VISIT (OUTPATIENT)
Dept: PHYSICAL THERAPY | Facility: REHABILITATION | Age: 79
End: 2024-03-29
Payer: MEDICARE

## 2024-03-29 DIAGNOSIS — M46.1 INFLAMMATION OF LEFT SACROILIAC JOINT (HCC): ICD-10-CM

## 2024-03-29 DIAGNOSIS — M70.72 ISCHIAL BURSITIS OF LEFT SIDE: Primary | ICD-10-CM

## 2024-03-29 DIAGNOSIS — M48.061 DEGENERATIVE LUMBAR SPINAL STENOSIS: ICD-10-CM

## 2024-03-29 DIAGNOSIS — M70.62 TROCHANTERIC BURSITIS OF LEFT HIP: ICD-10-CM

## 2024-03-29 DIAGNOSIS — M54.2 CERVICAL PAIN (NECK): ICD-10-CM

## 2024-03-29 PROCEDURE — 97110 THERAPEUTIC EXERCISES: CPT

## 2024-03-29 PROCEDURE — 97112 NEUROMUSCULAR REEDUCATION: CPT

## 2024-03-29 NOTE — PROGRESS NOTES
"Daily Note     Today's date: 3/29/2024  Patient name: Rustam Salcedo  : 1945  MRN: [id number]  Referring provider: Joe Aguilar,*  Dx:   Encounter Diagnosis     ICD-10-CM    1. Ischial bursitis of left side  M70.72       2. Trochanteric bursitis of left hip  M70.62       3. Degenerative lumbar spinal stenosis  M48.061       4. Inflammation of left sacroiliac joint (HCC)  M46.1       5. Cervical pain (neck)  M54.2           Start Time: 09  Stop Time: 1023  Total time in clinic (min): 38 minutes    Subjective: Pt reports continued improvement of symptoms.  Cervical symptoms have been most bothersome.        Objective: See treatment diary below      Assessment: Tolerated treatment well. Patient would benefit from continued PT.  Pt able to tolerate continued progression of planned therapeutic interventions this visit.  Challenged with windmills - unable to perform high windmill so low windmill was performed.  Mild discomfort during tx session.  No pain aggravation noted.  1:1 with Jomar Lopez DPT entirety of tx.        Plan: Continue per plan of care.      Precautions: HTN, hx CA    POC expires Unit limit Auth Expiration date PT/OT + Visit Limit?   24 BOMN N/A BOMN     Visit/Unit Tracking  AUTH Status:  Date 3/1 3/6 3/8 3/12 3/15 3/19 3/22 3/29   RE every 10 v Used 2 3 4 5 6 7 8 9      Pertinent Findings:                                                                                            Test / Measure  2024   FOTO (Predicted 64) 61   L hip ext MMT 4-/5   L hip abd MMT 4-/5   Cerv retr AROM <5%         Manuals 3/6 3/8 3/12 3/15 3/19 3/22 3/29                                           Neuro Re-Ed          Cerv retr          Cerv flex/ext + posterior resistance UE's 15x ea         Bridges          LTRs          Suitcase carry + [employer] 9\" step 15x B w/ contra UE hold 25# + [employer] 9\" step 15x B w/ contra UE hold 25# + static march 15x alt LE B 25# + 5# aw's + static march 15x alt LE B 25# " "+ 5# aw's [id number] B 35# [id number] B 35#    TB side step   3 laps thin black band 3 laps thin black band      TB monster walk          Pallof press + rot 10x B 9# + rot 10x B 9#        [place] resist retro walk 15x 20# + row iso 15x 20# + row iso        Runner's step ups + LPD    15x alt LE 6\" step, btb      Wall angels     2x10     Piter diag chops     10x B 12# 10x B 13#    [place] diag lifts     10x B 10# 10x B 10#    Cerv iso 4 way     5x5\" ea     TB horiz abd iso + walk out       3x5 gtb   Low windmill       5x B 3#   Prone I, T, Y       3x10 ea 3#   Ther Ex          HEP review / edu          NuStep 8' L9 8' L9 8' L9 8' L9 8' L10 8' L10    UBE       4'/4'   R UT/LS S          Rows          Sh ext          Facepulls       + OHP 2x10 9#   Shrugs 2x10 10# + rot 2x10 10# + rot  2x10 3\" hold 12# 2x10 3\" hold 12#  3x10 3\" hold 12#   Lying SLR          Stand hip 3 way     [place] 4 way 10x ea B 4# [place] 4 way 15x ea B 4#    Deadlift 3x6 25# 3x8 25# X-DL  2x5 B 25# X-DL  2x5 B 25#      Kickstand RDL          SL leg press 10x B 55#  10x B 70#  10x B 85# 10x B 70#  10x B 85#  10x B 100# 15x B 85#  10x B 100# 2x10 B 100# 2x10 B 100# 2x10 B 115# 2x10 B 115#   DL leg press 10x 100#  10x 115#  10x 130#  10x 145# 10x 145#  10x 160#  10x 190# 15x 175#  10x 190#  10x 205# 3x10 205# 3x10 205# 3x10 205# 10x 205#  10x 220#  2x10 235#   Mid trap S - hold rack 4x30\" 4x30\" 4x30\" 4x30\"      DB Sh flex into abd and back      10x ea 5# 10x ea 5#   Ther Activity                              Gait Training                              Modalities                                                   "

## 2024-04-02 ENCOUNTER — HOSPITAL ENCOUNTER (OUTPATIENT)
Dept: RADIOLOGY | Age: 79
Discharge: HOME | End: 2024-04-02
Payer: MEDICARE

## 2024-04-02 ENCOUNTER — APPOINTMENT (OUTPATIENT)
Dept: LAB | Age: 79
End: 2024-04-02
Payer: MEDICARE

## 2024-04-02 DIAGNOSIS — D17.9 ANGIOMYOLIPOMA: ICD-10-CM

## 2024-04-02 DIAGNOSIS — C61 PROSTATE CANCER (HCC): ICD-10-CM

## 2024-04-02 LAB — PSA SERPL-MCNC: 0.17 NG/ML (ref 0–4)

## 2024-04-02 PROCEDURE — 84153 ASSAY OF PSA TOTAL: CPT

## 2024-04-02 PROCEDURE — 76775 US EXAM ABDO BACK WALL LIM: CPT

## 2024-04-02 PROCEDURE — 36415 COLL VENOUS BLD VENIPUNCTURE: CPT

## 2024-04-03 ENCOUNTER — APPOINTMENT (OUTPATIENT)
Dept: PHYSICAL THERAPY | Facility: REHABILITATION | Age: 79
End: 2024-04-03
Payer: MEDICARE

## 2024-04-04 ENCOUNTER — EVALUATION (OUTPATIENT)
Dept: PHYSICAL THERAPY | Facility: REHABILITATION | Age: 79
End: 2024-04-04
Payer: MEDICARE

## 2024-04-04 DIAGNOSIS — M46.1 INFLAMMATION OF LEFT SACROILIAC JOINT (HCC): ICD-10-CM

## 2024-04-04 DIAGNOSIS — M70.72 ISCHIAL BURSITIS OF LEFT SIDE: Primary | ICD-10-CM

## 2024-04-04 DIAGNOSIS — M70.62 TROCHANTERIC BURSITIS OF LEFT HIP: ICD-10-CM

## 2024-04-04 DIAGNOSIS — M54.2 CERVICAL PAIN (NECK): ICD-10-CM

## 2024-04-04 DIAGNOSIS — M48.061 DEGENERATIVE LUMBAR SPINAL STENOSIS: ICD-10-CM

## 2024-04-04 PROCEDURE — 97164 PT RE-EVAL EST PLAN CARE: CPT

## 2024-04-04 PROCEDURE — 97112 NEUROMUSCULAR REEDUCATION: CPT

## 2024-04-04 PROCEDURE — 97110 THERAPEUTIC EXERCISES: CPT

## 2024-04-04 NOTE — PROGRESS NOTES
Daily Note     Today's date: 2024  Patient name: Rustam Salcedo  : 1945  MRN: [id number]  Referring provider: Joe Aguilar,*  Dx:   Encounter Diagnosis     ICD-10-CM    1. Ischial bursitis of left side  M70.72       2. Trochanteric bursitis of left hip  M70.62       3. Degenerative lumbar spinal stenosis  M48.061       4. Inflammation of left sacroiliac joint (HCC)  M46.1       5. Cervical pain (neck)  M54.2           Start Time: 915  Stop Time: 1001  Total time in clinic (min): 46 minutes    Subjective: Pt reports L hip has been feeling good.  States lumbar pain persists but is slightly improved.  L hip pain 0/10.  Lumbar pain 3/10.  Pt also notes a difference and improvement of cervical dysfunction.  Cervical pain 5-6/10.  Able to perform all tasks at home independently.  Forward bending/stooping aggravates symptoms.  When he does work in the yard he performs it from his knees to prevent excessive lumbar flexion.  Patient goals:  to keep status how it is now and potentially continue to improve.        Objective: See treatment diary below    Strength and ROM evaluated B from a regional biomechanical perspective and values relevant to this episode recorded in tables below.     ROM:   Joint / Motion  Right  Left    Lumbar Flexion  90%     Lumbar Extension  25%     Lumbar Sidebending  25% 25%   Lumbar rotation 50% 50%   Hip ER  30 30   Hip IR  20 20        Cervical flexion 80%    Cervical extension 15%    Cervical sidebending 40% 25%   Cervical rotation 25% 25%   Cervical retraction 10%    Cervical protraction 75%          Strength: MMT revealed the following findings.  Joint Motion Right Left   Hip Flexion 4+/5 4+/5   Hip Abduction 4+/5 4+/5   Hip Adduction 4+/5 4+/5   Hip Extension 4+/5 4+/5   Knee Extension 5/5 5/5   Knee Flexion 5/5 5/5   Ankle Plantarflexion 5/5 5/5   Ankle Dorsiflexion 5/5 5/5        Special Tests:  Lumbar Specific and Neural Tension  Test / Measure     Prone  instability test +       SI Joint Screen: unremarkable     Treatment Based Classification: Primary flexion responder; Secondary stability    Assessment: Lumbar and cervical mobility remains limited at this time, but functionality is gradually improving.  LLE global hip strength gains noted.  Continues to be pain limited, especially with lumbar mobility closing on scoliotic curve and decreased lordodic curve - sidebending and extension are most limited and pain provoking.  Continuing to work towards long-term goals and one short-term goal.  Tolerated treatment well. Patient would benefit from continued PT    Assessment details: Rustam Salcedo is a 78 y.o. male presenting with acute exacerbation of chronic lumbar and L hip pain as well as chronic R sided cervical pain.  Primary impairments include lumbar, L posterior hip, and R cervical pain with functional activities, proximal LLE weakness, cervical/hip/lumbar AROM dysfunction, deep neck flexor and paraspinal motor control dysfunction.  Educated pt on anatomy and physiology of diagnosis.  Will benefit from skilled PT interventions for community reintegration, ADL management/independence, return to sport/hobbies.  Provided pt with written home exercise program to be completed daily.    Impairments: abnormal coordination, abnormal muscle firing, abnormal muscle tone, abnormal or restricted ROM, activity intolerance, impaired physical strength, lacks appropriate home exercise program, pain with function, poor posture  and poor body mechanics  Functional limitations: lifting/carrying heavy objects, cervical mobility, prolonged walking, repeated lumbar movements  Symptom irritability: moderateUnderstanding of Dx/Px/POC: good   Prognosis: good    Goals    Short Term Goals:  In 4 weeks, the patient will:  1. Improve L hip abduction strength to at least 4+/5 MMT - MET  2. Improve L hip extension strength to at least 4+/5 MMT - MET  3. Supervision with HEP for self-care  - MET  4. Improve cervical retraction AROM to at least 25% - ONGOING    Long Term Goals:  In 8 weeks, the patient will:  1. Tolerate lifting 35+ lbs from the ground without aggravating symptoms - MET  2. FOTO to greater than predicted value - MET  3. Independent with HEP for self-care - ONGOING  4. Decrease cervical pain intensity to less than 3/10 - ONGOING    Plan: Continue per plan of care.     Patient would benefit from: skilled physical therapy  Planned modality interventions: manual electrical stimulation  Planned therapy interventions: abdominal trunk stabilization, activity modification, balance, balance/weight bearing training, behavior modification, body mechanics training, community reintegration, coordination, fine motor coordination training, flexibility, functional ROM exercises, gait training, graded activity, graded exercise, graded motor, home exercise program, work reintegration, therapeutic training, therapeutic exercise, therapeutic activities, stretching, strengthening, self care, postural training, patient education, neuromuscular re-education, motor coordination training, massage, manual therapy, joint mobilization and ADL training  Duration in weeks: 8  Plan of Care beginning date: 2/28/2024  Plan of Care expiration date: 4/24/2024  Treatment plan discussed with: patient     Precautions: HTN, hx CA    POC expires Unit limit Auth Expiration date PT/OT + Visit Limit?   4/24/24 BOMN N/A BOMN     Visit/Unit Tracking  AUTH Status:  Date 3/1 3/6 3/8 3/12 3/15 3/19 3/22 3/26 3/29 4/4   RE every 10 v Used 2 3 4 5 6 7 8 9 10 11 RE      Pertinent Findings:                                                                                            Test / Measure  2/28/2024 4/4/2024   FOTO (Predicted 64) 61 77   L hip ext MMT 4-/5 4+/5   L hip abd MMT 4-/5 4+/5   Cerv retr AROM <5% 10%         Manuals 3/12 3/15 3/19 3/22 3/26 3/29 4/4                                           Neuro Re-Ed          Cerv  "retr          Cerv flex/ext + posterior resistance UE's          Bridges          LTRs          Suitcase carry + static march 15x alt LE B 25# + 5# aw's + static march 15x alt LE B 25# + 5# aw's [id number] B 35# [id number] B 35# [id number] B 35#     TB side step 3 laps thin black band 3 laps thin black band        TB monster walk          Pallof press          Piter resist retro walk          Runner's step ups + LPD  15x alt LE 6\" step, btb        Wall angels   2x10       [place] diag chops   10x B 12# 10x B 13# 10x B 13#      [place] diag lifts   10x B 10# 10x B 10# 10x B 13#      Cerv iso 4 way   5x5\" ea       TB horiz abd iso + walk out      3x5 gtb    Low windmill      5x B 3#    Prone I, T, Y      3x10 ea 3#    [place] curl       3x5 15#   Hip hike on step       3x10 B   Full moon w/ plate       10x B 10#   Ther Ex          HEP review / edu       10'   NuStep 8' L9 8' L9 8' L10 8' L10 8' 10   8' L10   UBE      4'/4'    R UT/LS S          Rows          Sh ext          Facepulls      + OHP 2x10 9#    Shrugs  2x10 3\" hold 12# 2x10 3\" hold 12#  2x10 hold 12# 3x10 3\" hold 12#    Lying SLR          Stand hip 3 way   [place] 4 way 10x ea B 4# [place] 4 way 15x ea B 4# [place] 4 way 15x ea B 4#     Deadlift X-DL  2x5 B 25# X-DL  2x5 B 25#     3x6 35#   Kickstand RDL          SL leg press 15x B 85#  10x B 100# 2x10 B 100# 2x10 B 100# 2x10 B 115# 2x10 B 115# 2x10 B 115#    DL leg press 15x 175#  10x 190#  10x 205# 3x10 205# 3x10 205# 3x10 205# 3x10 205# 10x 205#  10x 220#  2x10 235#    Mid trap S - hold rack 4x30\" 4x30\"        DB Sh flex into abd and back    10x ea 5# 10x ea 5# 10x ea 5#    Ther Activity                              Gait Training                              Modalities                                                     "

## 2024-04-05 ENCOUNTER — APPOINTMENT (OUTPATIENT)
Dept: PHYSICAL THERAPY | Facility: REHABILITATION | Age: 79
End: 2024-04-05
Payer: MEDICARE

## 2024-04-09 ENCOUNTER — OFFICE VISIT (OUTPATIENT)
Dept: PHYSICAL THERAPY | Facility: REHABILITATION | Age: 79
End: 2024-04-09
Payer: MEDICARE

## 2024-04-09 DIAGNOSIS — M46.1 INFLAMMATION OF LEFT SACROILIAC JOINT (HCC): ICD-10-CM

## 2024-04-09 DIAGNOSIS — M48.061 DEGENERATIVE LUMBAR SPINAL STENOSIS: ICD-10-CM

## 2024-04-09 DIAGNOSIS — M70.62 TROCHANTERIC BURSITIS OF LEFT HIP: ICD-10-CM

## 2024-04-09 DIAGNOSIS — M70.72 ISCHIAL BURSITIS OF LEFT SIDE: Primary | ICD-10-CM

## 2024-04-09 DIAGNOSIS — M54.2 CERVICAL PAIN (NECK): ICD-10-CM

## 2024-04-09 PROCEDURE — 97110 THERAPEUTIC EXERCISES: CPT

## 2024-04-09 PROCEDURE — 97112 NEUROMUSCULAR REEDUCATION: CPT

## 2024-04-09 NOTE — PROGRESS NOTES
Daily Note     Today's date: 2024  Patient name: Rustam Salcedo  : 1945  MRN: [id number]  Referring provider: Joe Aguilar,*  Dx:   Encounter Diagnosis     ICD-10-CM    1. Ischial bursitis of left side  M70.72       2. Trochanteric bursitis of left hip  M70.62       3. Degenerative lumbar spinal stenosis  M48.061       4. Inflammation of left sacroiliac joint (HCC)  M46.1       5. Cervical pain (neck)  M54.2           Start Time: 0940  Stop Time: 1003  Total time in clinic (min): 23 minutes    Subjective: Pt reports continued RUE weakness and discomfort following dermatology appt.  States that next visit will be his last.        Objective: See treatment diary below      Assessment: Tolerated treatment well. Patient would benefit from continued PT.  Pt able to tolerate continued progression of planned therapeutic interventions this visit.  Challenged with trunk rotation and lateral flexion biased interventions due to scoliotic curve.  Mild fatigue noted post-session.  Pt arrived 10 minutes late to appt.  1:1 with Jomar Lopez DPT entirety of tx.        Plan: Continue per plan of care.      Precautions: HTN, hx CA    POC expires Unit limit Auth Expiration date PT/OT + Visit Limit?   24 BOMN N/A BOMN     Visit/Unit Tracking  AUTH Status:  Date 3/8 3/12 3/15 3/19 3/22 3/26 3/29 4/4 4/9   RE every 10 v Used 4 5 6 7 8 9 10 11 RE 12      Pertinent Findings:                                                                                            Test / Measure  2024   FOTO (Predicted 64) 61 77   L hip ext MMT 4-/5 4+/5   L hip abd MMT 4-/5 4+/5   Cerv retr AROM <5% 10%         Manuals 3/12 3/15 3/19 3/22 3/26 3/29 4/4 4/9                                               Neuro Re-Ed           Cerv retr           Cerv flex/ext + posterior resistance UE's           Bridges           LTRs           Suitcase carry + static march 15x alt LE B 25# + 5# aw's + static march 15x alt LE B 25#  "+ 5# aw's [id number] B 35# [id number] B 35# [id number] B 35#   Stand side crunch 2x10 B 25#   TB side step 3 laps thin black band 3 laps thin black band         TB monster walk           Pallof press           Piter resist retro walk           Runner's step ups + LPD  15x alt LE 6\" step, btb         Wall angels   2x10        [place] diag chops   10x B 12# 10x B 13# 10x B 13#       [place] diag lifts   10x B 10# 10x B 10# 10x B 13#       Cerv iso 4 way   5x5\" ea        TB horiz abd iso + walk out      3x5 gtb     Low windmill      5x B 3#     Prone I, T, Y      3x10 ea 3#     [place] curl       3x5 15# 3x5 20#   Hip hike on step       3x10 B 3x10 B   Full moon w/ plate       10x ea 10# 10x ea 10#   PB rollout 3 way        15x ea   PB wall squats        2x10   Ther Ex           HEP review / edu       10'    NuStep 8' L9 8' L9 8' L10 8' L10 8' 10   8' L10 Rec Bike 8' L   UBE      4'/4'     R UT/LS S           Rows           Sh ext           Facepulls      + OHP 2x10 9#     Shrugs  2x10 3\" hold 12# 2x10 3\" hold 12#  2x10 hold 12# 3x10 3\" hold 12#     Lying SLR           Stand hip 3 way   Piter 4 way 10x ea B 4# Piter 4 way 15x ea B 4# Piter 4 way 15x ea B 4#      Deadlift X-DL  2x5 B 25# X-DL  2x5 B 25#     3x6 35#    Kickstand RDL           SL leg press 15x B 85#  10x B 100# 2x10 B 100# 2x10 B 100# 2x10 B 115# 2x10 B 115# 2x10 B 115#  2x10 B 115#   DL leg press 15x 175#  10x 190#  10x 205# 3x10 205# 3x10 205# 3x10 205# 3x10 205# 10x 205#  10x 220#  2x10 235#  10x 205#  10x 220#  2x10 235#   Mid trap S - hold rack 4x30\" 4x30\"         DB Sh flex into abd and back    10x ea 5# 10x ea 5# 10x ea 5#     Ther Activity                                 Gait Training                                 Modalities                                                          "

## 2024-04-10 ENCOUNTER — APPOINTMENT (OUTPATIENT)
Dept: PHYSICAL THERAPY | Facility: REHABILITATION | Age: 79
End: 2024-04-10
Payer: MEDICARE

## 2024-04-11 ENCOUNTER — IOP CHECK (OUTPATIENT)
Dept: URBAN - METROPOLITAN AREA CLINIC 6 | Facility: CLINIC | Age: 79
End: 2024-04-11

## 2024-04-11 ENCOUNTER — APPOINTMENT (OUTPATIENT)
Dept: PHYSICAL THERAPY | Facility: REHABILITATION | Age: 79
End: 2024-04-11
Payer: MEDICARE

## 2024-04-11 DIAGNOSIS — H25.813: ICD-10-CM

## 2024-04-11 DIAGNOSIS — H50.22: ICD-10-CM

## 2024-04-11 DIAGNOSIS — H52.4: ICD-10-CM

## 2024-04-11 DIAGNOSIS — H50.21: ICD-10-CM

## 2024-04-11 PROCEDURE — 92015 DETERMINE REFRACTIVE STATE: CPT

## 2024-04-11 PROCEDURE — 99213 OFFICE O/P EST LOW 20 MIN: CPT

## 2024-04-11 ASSESSMENT — VISUAL ACUITY
OD_CC: 20/40
OS_CC: 20/30+2

## 2024-04-11 ASSESSMENT — TONOMETRY
OS_IOP_MMHG: 11
OD_IOP_MMHG: 10

## 2024-04-12 ENCOUNTER — OFFICE VISIT (OUTPATIENT)
Dept: UROLOGY | Facility: AMBULATORY SURGERY CENTER | Age: 79
End: 2024-04-12
Payer: MEDICARE

## 2024-04-12 VITALS
HEIGHT: 71 IN | SYSTOLIC BLOOD PRESSURE: 140 MMHG | BODY MASS INDEX: 27.58 KG/M2 | DIASTOLIC BLOOD PRESSURE: 80 MMHG | WEIGHT: 197 LBS | HEART RATE: 75 BPM

## 2024-04-12 DIAGNOSIS — C61 PROSTATE CANCER (HCC): Primary | ICD-10-CM

## 2024-04-12 PROCEDURE — 99214 OFFICE O/P EST MOD 30 MIN: CPT | Performed by: UROLOGY
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
## 2024-04-12 NOTE — ASSESSMENT & PLAN NOTE
Impression: Prostate cancer, status post robot-assisted laparoscopic prostatectomy (2004), status post radiation therapy (2017), detectable PSA 0.17    Plan: I had a lengthy discussion with the patient and his wife in the office today regarding his detectable and slowly rising PSA post prostatectomy, post radiation therapy.  Fortunately at this time he is completely asymptomatic.  We discussed that a PSMA CT PET scan typically is not sensitive enough to detect prostate cancer until the PSA is approaching 1.0.  At this time I am recommending conservative management.  I feel that it is premature to initiate androgen deprivation therapy with Lupron.  The patient is in agreement with this plan and will return in 3 months time.

## 2024-04-16 ENCOUNTER — OFFICE VISIT (OUTPATIENT)
Dept: PHYSICAL THERAPY | Facility: REHABILITATION | Age: 79
End: 2024-04-16
Payer: MEDICARE

## 2024-04-16 DIAGNOSIS — M70.62 TROCHANTERIC BURSITIS OF LEFT HIP: ICD-10-CM

## 2024-04-16 DIAGNOSIS — M54.2 CERVICAL PAIN (NECK): ICD-10-CM

## 2024-04-16 DIAGNOSIS — M48.061 DEGENERATIVE LUMBAR SPINAL STENOSIS: ICD-10-CM

## 2024-04-16 DIAGNOSIS — M46.1 INFLAMMATION OF LEFT SACROILIAC JOINT (HCC): ICD-10-CM

## 2024-04-16 DIAGNOSIS — M70.72 ISCHIAL BURSITIS OF LEFT SIDE: Primary | ICD-10-CM

## 2024-04-16 PROCEDURE — 97164 PT RE-EVAL EST PLAN CARE: CPT

## 2024-04-16 PROCEDURE — 97110 THERAPEUTIC EXERCISES: CPT

## 2024-04-16 PROCEDURE — 97112 NEUROMUSCULAR REEDUCATION: CPT

## 2024-04-16 NOTE — PROGRESS NOTES
"Discharge Summary     Today's date: 2024  Patient name: Rustam Salcedo  : 1945  MRN: [id number]  Referring provider: Joe Aguilar,*  Dx:   Encounter Diagnosis     ICD-10-CM    1. Ischial bursitis of left side  M70.72       2. Trochanteric bursitis of left hip  M70.62       3. Degenerative lumbar spinal stenosis  M48.061       4. Inflammation of left sacroiliac joint (HCC)  M46.1       5. Cervical pain (neck)  M54.2           Start Time: 1045  Stop Time: 1115  Total time in clinic (min): 30 minutes    Subjective: Pt reports current rehab status is at about 80%.  States that hip pain has resolved.  Lumbar and cervical pain are improved but pain persists.  Lumbar pain 4-5/10; cervical pain 4-5/10.  Currently does not have lumbar pain during start of tx session, but he notes that pain continues to be aggravated with prolonged yard work or repetitive lifting.  Cervical pain seems to still radiate to R Sh region.  Pt goals is for pain and functionality to \"stay the same and not get worse, maintain the muscle strength.\"      Objective: See treatment diary below    Strength and ROM evaluated B from a regional biomechanical perspective and values relevant to this episode recorded in tables below.     ROM:   Joint / Motion  Right  Left    Lumbar Flexion  90%     Lumbar Extension  25%     Lumbar Sidebending  25% 25%   Lumbar rotation 50% 50%   Hip ER  30 30   Hip IR  20 20        Cervical flexion 85%    Cervical extension 15%    Cervical sidebending 40% 25%   Cervical rotation 25% 25%   Cervical retraction 10%    Cervical protraction 75%          Strength: MMT revealed the following findings.  Joint Motion Right Left   Hip Flexion 4+/5 4+/5   Hip Abduction 4+/5 4+/5   Hip Adduction 5/5 5/5   Hip Extension 4+/5 4+/5   Knee Extension 5/5 5/5   Knee Flexion 5/5 5/5   Ankle Plantarflexion 5/5 5/5   Ankle Dorsiflexion 5/5 5/5        Special Tests:  Lumbar Specific and Neural Tension  Test / Measure   "   Prone instability test +       SI Joint Screen: unremarkable     Treatment Based Classification: Primary flexion responder; Secondary stability    Assessment: Pt made good progress throughout the process of outpatient PT.  Able to achieve most short-term and long-term goals.  Functionality is much improved.  Tolerated treatment well. Patient exhibited good technique with therapeutic exercises.  1:1 with Jomar Lopez DPT entirety of tx.      Assessment details: Rustam Salcedo is a 78 y.o. male presenting with acute exacerbation of chronic lumbar and L hip pain as well as chronic R sided cervical pain.  Primary impairments include lumbar, L posterior hip, and R cervical pain with functional activities, proximal LLE weakness, cervical/hip/lumbar AROM dysfunction, deep neck flexor and paraspinal motor control dysfunction.  Educated pt on anatomy and physiology of diagnosis.  Will benefit from skilled PT interventions for community reintegration, ADL management/independence, return to sport/hobbies.  Provided pt with written home exercise program to be completed daily.    Impairments: abnormal coordination, abnormal muscle firing, abnormal muscle tone, abnormal or restricted ROM, activity intolerance, impaired physical strength, lacks appropriate home exercise program, pain with function, poor posture  and poor body mechanics  Functional limitations: lifting/carrying heavy objects, cervical mobility, prolonged walking, repeated lumbar movements  Symptom irritability: moderateUnderstanding of Dx/Px/POC: good   Prognosis: good    Goals    Short Term Goals:  In 4 weeks, the patient will:  1. Improve L hip abduction strength to at least 4+/5 MMT - MET  2. Improve L hip extension strength to at least 4+/5 MMT - MET  3. Supervision with HEP for self-care - MET  4. Improve cervical retraction AROM to at least 25% - NOT MET    Long Term Goals:  In 8 weeks, the patient will:  1. Tolerate lifting 35+ lbs from the ground  "without aggravating symptoms - MET  2. FOTO to greater than predicted value - MET  3. Independent with HEP for self-care - MET  4. Decrease cervical pain intensity to less than 3/10 - NOT MET    Plan: Discharge from skilled outpatient physical therapy services at this time to complete written home exercise program independently.     Precautions: HTN, hx CA    POC expires Unit limit Auth Expiration date PT/OT + Visit Limit?   4/24/24 BOMN N/A BOMN     Visit/Unit Tracking  AUTH Status:  Date 3/8 3/12 3/15 3/19 3/22 3/26 3/29 4/4 4/9 4/16   RE every 10 v Used 4 5 6 7 8 9 10 11 RE 12 13      Pertinent Findings:                                                                                            Test / Measure  2/28/2024 4/4/2024   FOTO (Predicted 64) 61 77   L hip ext MMT 4-/5 4+/5   L hip abd MMT 4-/5 4+/5   Cerv retr AROM <5% 10%         Manuals 3/15 3/19 3/22 3/26 3/29 4/4 4/9 4/16                                               Neuro Re-Ed           Cerv retr           Cerv flex/ext + posterior resistance UE's           Bridges           LTRs           Suitcase carry + static march 15x alt LE B 25# + 5# aw's [id number] B 35# [id number] B 35# [id number] B 35#   Stand side crunch 2x10 B 25#    TB side step 3 laps thin black band          TB monster walk           [place] press           [place] resist retro walk           Runner's step ups + LPD 15x alt LE 6\" step, btb          Wall angels  2x10         [place] diag chops  10x B 12# 10x B 13# 10x B 13#        Piter diag lifts  10x B 10# 10x B 10# 10x B 13#        Cerv iso 4 way  5x5\" ea         TB horiz abd iso + walk out     3x5 gtb      Low windmill     5x B 3#      Prone I, T, Y     3x10 ea 3#      [place] curl      3x5 15# 3x5 20#    Hip hike on step      3x10 B 3x10 B    Full moon w/ plate      10x ea 10# 10x ea 10# 10x ea 10#   PB rollout 3 way       15x ea    PB wall squats       2x10 2x10   Ther Ex           HEP review / edu      10'  5'   NuStep 8' L9 8' L10 8' " "L10 8' 10   8' L10 Rec Bike 8' L3    UBE     4'/4'   4'/4' L6   R UT/LS S           Rows           Sh ext           Facepulls     + OHP 2x10 9#   + OHP 2x10 9#   Shrugs 2x10 3\" hold 12# 2x10 3\" hold 12#  2x10 hold 12# 3x10 3\" hold 12#      Lying SLR           Stand hip 3 way  Piter 4 way 10x ea B 4# [place] 4 way 15x ea B 4# [place] 4 way 15x ea B 4#       Deadlift X-DL  2x5 B 25#     3x6 35#     Kickstand RDL           SL leg press 2x10 B 100# 2x10 B 100# 2x10 B 115# 2x10 B 115# 2x10 B 115#  2x10 B 115#    DL leg press 3x10 205# 3x10 205# 3x10 205# 3x10 205# 10x 205#  10x 220#  2x10 235#  10x 205#  10x 220#  2x10 235#    Mid trap S - hold rack 4x30\"          DB Sh flex into abd and back   10x ea 5# 10x ea 5# 10x ea 5#      Ther Activity                                 Gait Training                                 Modalities                                                            "

## 2024-07-12 ENCOUNTER — APPOINTMENT (OUTPATIENT)
Dept: LAB | Age: 79
End: 2024-07-12
Payer: MEDICARE

## 2024-07-12 ENCOUNTER — TELEPHONE (OUTPATIENT)
Dept: UROLOGY | Facility: AMBULATORY SURGERY CENTER | Age: 79
End: 2024-07-12

## 2024-07-12 DIAGNOSIS — C61 PROSTATE CANCER (HCC): ICD-10-CM

## 2024-07-12 LAB — PSA SERPL-MCNC: 0.16 NG/ML (ref 0–4)

## 2024-07-12 PROCEDURE — 36415 COLL VENOUS BLD VENIPUNCTURE: CPT

## 2024-07-12 PROCEDURE — 84153 ASSAY OF PSA TOTAL: CPT

## 2024-07-12 NOTE — TELEPHONE ENCOUNTER
Called/spoke with patient to remind him of the open/active PSA order from 4/12/24 that is needed prior to his appointment next Wed. 7/17 with Kenny. He was aware and was planning on having it done today.

## 2024-07-17 ENCOUNTER — OFFICE VISIT (OUTPATIENT)
Dept: UROLOGY | Facility: AMBULATORY SURGERY CENTER | Age: 79
End: 2024-07-17
Payer: MEDICARE

## 2024-07-17 VITALS
OXYGEN SATURATION: 95 % | WEIGHT: 193 LBS | DIASTOLIC BLOOD PRESSURE: 88 MMHG | HEIGHT: 71 IN | HEART RATE: 85 BPM | BODY MASS INDEX: 27.02 KG/M2 | SYSTOLIC BLOOD PRESSURE: 132 MMHG

## 2024-07-17 DIAGNOSIS — C61 PROSTATE CANCER (HCC): Primary | ICD-10-CM

## 2024-07-17 PROCEDURE — 99213 OFFICE O/P EST LOW 20 MIN: CPT
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
## 2024-08-19 DIAGNOSIS — M25.562 LEFT KNEE PAIN, UNSPECIFIED CHRONICITY: Primary | ICD-10-CM

## 2024-08-26 ENCOUNTER — APPOINTMENT (OUTPATIENT)
Dept: LAB | Age: 79
End: 2024-08-26
Payer: MEDICARE

## 2024-08-26 DIAGNOSIS — C61 PROSTATE CANCER (HCC): ICD-10-CM

## 2024-08-26 LAB — PSA SERPL-MCNC: 0.15 NG/ML (ref 0–4)

## 2024-08-26 PROCEDURE — 84153 ASSAY OF PSA TOTAL: CPT

## 2024-08-26 PROCEDURE — 36415 COLL VENOUS BLD VENIPUNCTURE: CPT

## 2024-09-03 ENCOUNTER — OFFICE VISIT (OUTPATIENT)
Dept: OBGYN CLINIC | Facility: HOSPITAL | Age: 79
End: 2024-09-03
Payer: MEDICARE

## 2024-09-03 ENCOUNTER — HOSPITAL ENCOUNTER (OUTPATIENT)
Dept: RADIOLOGY | Facility: HOSPITAL | Age: 79
Discharge: HOME | End: 2024-09-03
Attending: ORTHOPAEDIC SURGERY
Payer: MEDICARE

## 2024-09-03 VITALS
WEIGHT: 193 LBS | DIASTOLIC BLOOD PRESSURE: 74 MMHG | SYSTOLIC BLOOD PRESSURE: 151 MMHG | BODY MASS INDEX: 27.02 KG/M2 | HEIGHT: 71 IN | HEART RATE: 88 BPM

## 2024-09-03 DIAGNOSIS — M17.11 PRIMARY OSTEOARTHRITIS OF RIGHT KNEE: ICD-10-CM

## 2024-09-03 DIAGNOSIS — M25.561 RIGHT KNEE PAIN, UNSPECIFIED CHRONICITY: ICD-10-CM

## 2024-09-03 DIAGNOSIS — M25.811 IMPINGEMENT OF RIGHT SHOULDER: ICD-10-CM

## 2024-09-03 DIAGNOSIS — M25.561 RIGHT KNEE PAIN, UNSPECIFIED CHRONICITY: Primary | ICD-10-CM

## 2024-09-03 PROCEDURE — 73562 X-RAY EXAM OF KNEE 3: CPT

## 2024-09-03 PROCEDURE — 99214 OFFICE O/P EST MOD 30 MIN: CPT | Performed by: ORTHOPAEDIC SURGERY

## 2024-09-03 PROCEDURE — 20610 DRAIN/INJ JOINT/BURSA W/O US: CPT | Performed by: ORTHOPAEDIC SURGERY

## 2024-09-03 RX ORDER — LIDOCAINE HYDROCHLORIDE 10 MG/ML
2 INJECTION, SOLUTION INFILTRATION; PERINEURAL
Status: COMPLETED | OUTPATIENT
Start: 2024-09-03 | End: 2024-09-03

## 2024-09-03 RX ORDER — BETAMETHASONE SODIUM PHOSPHATE AND BETAMETHASONE ACETATE 3; 3 MG/ML; MG/ML
12 INJECTION, SUSPENSION INTRA-ARTICULAR; INTRALESIONAL; INTRAMUSCULAR; SOFT TISSUE
Status: COMPLETED | OUTPATIENT
Start: 2024-09-03 | End: 2024-09-03

## 2024-09-03 RX ORDER — BUPIVACAINE HYDROCHLORIDE 2.5 MG/ML
2 INJECTION, SOLUTION INFILTRATION; PERINEURAL
Status: COMPLETED | OUTPATIENT
Start: 2024-09-03 | End: 2024-09-03

## 2024-09-03 RX ADMIN — BETAMETHASONE SODIUM PHOSPHATE AND BETAMETHASONE ACETATE 12 MG: 3; 3 INJECTION, SUSPENSION INTRA-ARTICULAR; INTRALESIONAL; INTRAMUSCULAR; SOFT TISSUE at 09:30

## 2024-09-03 RX ADMIN — LIDOCAINE HYDROCHLORIDE 2 ML: 10 INJECTION, SOLUTION INFILTRATION; PERINEURAL at 09:30

## 2024-09-03 RX ADMIN — BUPIVACAINE HYDROCHLORIDE 2 ML: 2.5 INJECTION, SOLUTION INFILTRATION; PERINEURAL at 09:30
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
## 2024-11-11 ENCOUNTER — LAB REQUISITION (OUTPATIENT)
Dept: LAB | Facility: HOSPITAL | Age: 79
End: 2024-11-11
Payer: MEDICARE

## 2024-11-11 DIAGNOSIS — I10 ESSENTIAL (PRIMARY) HYPERTENSION: ICD-10-CM

## 2024-11-11 DIAGNOSIS — E78.5 HYPERLIPIDEMIA, UNSPECIFIED: ICD-10-CM

## 2024-11-11 DIAGNOSIS — C61 MALIGNANT NEOPLASM OF PROSTATE (HCC): ICD-10-CM

## 2024-11-11 LAB
ALBUMIN SERPL BCG-MCNC: 4.2 G/DL (ref 3.5–5)
ALP SERPL-CCNC: 59 U/L (ref 34–104)
ALT SERPL W P-5'-P-CCNC: 17 U/L (ref 7–52)
ANION GAP SERPL CALCULATED.3IONS-SCNC: 10 MMOL/L (ref 4–13)
AST SERPL W P-5'-P-CCNC: 21 U/L (ref 13–39)
BILIRUB SERPL-MCNC: 0.48 MG/DL (ref 0.2–1)
BUN SERPL-MCNC: 16 MG/DL (ref 5–25)
CALCIUM SERPL-MCNC: 9.8 MG/DL (ref 8.4–10.2)
CHLORIDE SERPL-SCNC: 103 MMOL/L (ref 96–108)
CHOLEST SERPL-MCNC: 190 MG/DL
CO2 SERPL-SCNC: 25 MMOL/L (ref 21–32)
CREAT SERPL-MCNC: 0.78 MG/DL (ref 0.6–1.3)
GFR SERPL CREATININE-BSD FRML MDRD: 85 ML/MIN/1.73SQ M
GLUCOSE SERPL-MCNC: 84 MG/DL (ref 65–140)
HDLC SERPL-MCNC: 58 MG/DL
LDLC SERPL CALC-MCNC: 80 MG/DL (ref 0–100)
NONHDLC SERPL-MCNC: 132 MG/DL
POTASSIUM SERPL-SCNC: 4.8 MMOL/L (ref 3.5–5.3)
PROT SERPL-MCNC: 6.4 G/DL (ref 6.4–8.4)
PSA SERPL-MCNC: 0.18 NG/ML (ref 0–4)
SODIUM SERPL-SCNC: 138 MMOL/L (ref 135–147)
TRIGL SERPL-MCNC: 260 MG/DL

## 2024-11-11 PROCEDURE — 80061 LIPID PANEL: CPT | Performed by: INTERNAL MEDICINE

## 2024-11-11 PROCEDURE — 80053 COMPREHEN METABOLIC PANEL: CPT | Performed by: INTERNAL MEDICINE

## 2024-11-11 PROCEDURE — G0103 PSA SCREENING: HCPCS | Performed by: INTERNAL MEDICINE

## 2024-12-20 ENCOUNTER — ESTABLISHED COMPREHENSIVE EXAM (OUTPATIENT)
Dept: URBAN - METROPOLITAN AREA CLINIC 6 | Facility: CLINIC | Age: 79
End: 2024-12-20

## 2024-12-20 DIAGNOSIS — H52.4: ICD-10-CM

## 2024-12-20 DIAGNOSIS — H50.22: ICD-10-CM

## 2024-12-20 DIAGNOSIS — H43.813: ICD-10-CM

## 2024-12-20 DIAGNOSIS — H25.813: ICD-10-CM

## 2024-12-20 DIAGNOSIS — H50.21: ICD-10-CM

## 2024-12-20 PROCEDURE — 92014 COMPRE OPH EXAM EST PT 1/>: CPT

## 2024-12-20 ASSESSMENT — VISUAL ACUITY
OD_CC: 20/30
OS_CC: 20/25

## 2024-12-20 ASSESSMENT — TONOMETRY
OD_IOP_MMHG: 10
OS_IOP_MMHG: 12

## 2025-01-21 ENCOUNTER — APPOINTMENT (OUTPATIENT)
Dept: LAB | Age: 80
End: 2025-01-21
Payer: MEDICARE

## 2025-01-21 ENCOUNTER — TELEPHONE (OUTPATIENT)
Age: 80
End: 2025-01-21

## 2025-01-21 DIAGNOSIS — C61 PROSTATE CANCER (HCC): ICD-10-CM

## 2025-01-21 DIAGNOSIS — Z85.46 HISTORY OF PROSTATE CANCER: Primary | ICD-10-CM

## 2025-01-21 LAB — PSA SERPL-MCNC: 0.21 NG/ML (ref 0–4)

## 2025-01-21 PROCEDURE — 36415 COLL VENOUS BLD VENIPUNCTURE: CPT

## 2025-01-21 PROCEDURE — 84153 ASSAY OF PSA TOTAL: CPT

## 2025-01-21 NOTE — TELEPHONE ENCOUNTER
Pt called for a psa script I did advise that there is a psa in the system and he ryland has to go to the lab to get the blood work they will see the order

## 2025-01-23 NOTE — TELEPHONE ENCOUNTER
Pt called and stated he reviewed his PSA results, he is asking for Kenny to review them as well and determine the appropriate timeframe for his next follow up, inquiring if he should keep the 2/4/25 appointment or wait 3-6 months with a repeat PSA. Please advise.     Call back: [phone]

## 2025-01-23 NOTE — TELEPHONE ENCOUNTER
PSA reviewed now measuring 0.2 up from 0.18 2 months ago.  I do not think that patient necessarily needs to be seen in the office early February.  PSA is still low enough that I think PSMA PET scan would would not be sensitive.  I think that continued close interval PSA obtainment is reasonable.  I think we will continue to check PSA on a 3-month interval.  He can obtain a PSA in 3 months.  He can have an appointment at that point in time.  Please help with rescheduling.  CCing Dr. Laguerre for any further or alternative recommendations

## 2025-04-23 ENCOUNTER — APPOINTMENT (OUTPATIENT)
Dept: LAB | Age: 80
End: 2025-04-23
Payer: MEDICARE

## 2025-04-23 DIAGNOSIS — Z12.5 SPECIAL SCREENING, PROSTATE CANCER: ICD-10-CM

## 2025-04-23 DIAGNOSIS — Z85.46 HISTORY OF PROSTATE CANCER: ICD-10-CM

## 2025-04-23 DIAGNOSIS — C61 PROSTATE CANCER (HCC): ICD-10-CM

## 2025-04-23 LAB
PSA SERPL-MCNC: 0.21 NG/ML (ref 0–4)
PSA SERPL-MCNC: 0.21 NG/ML (ref 0–4)

## 2025-04-23 PROCEDURE — G0103 PSA SCREENING: HCPCS

## 2025-04-23 PROCEDURE — 36415 COLL VENOUS BLD VENIPUNCTURE: CPT

## 2025-04-23 PROCEDURE — 84153 ASSAY OF PSA TOTAL: CPT

## 2025-05-26 NOTE — PROGRESS NOTES
Daily Note     Today's date: 2023  Patient name: Dafne Kate  : 1945  MRN: [id number]  Referring provider: Chevy Steen  Dx:   Encounter Diagnosis     ICD-10-CM    1  Inflammation of left sacroiliac joint (HCC)  M46 1       2  Trochanteric bursitis of left hip  M70 62       3  Degenerative lumbar spinal stenosis  M48 061           Start Time: 1445  Stop Time: 1530  Total time in clinic (min): 45 minutes    Subjective: Pt reports moderate soreness following last tx session  States mild lumbar discomfort at start of tx session but overall improvement noted since starting outpatient PT  Objective: See treatment diary below      Assessment: Tolerated treatment well  Patient would benefit from continued PT  Pt able to tolerate slight progression of POC this tx session focusing on lumbar dysfunction  Challenged by increase in resistances for pallof press and STS transfers  Continuing to address flexion bias when able  No aggravation of pain symptomology throughout tx session - mild fatigue noted post-session  1:1 with Sue Crain DPT entirety of tx  Plan: Progress treatment as tolerated         Precautions: HTN, hx CA      Manuals                            Neuro Re-Ed      LTR 10x 10x    SKTC 5x5" B     DKTC  2x15 w/ PB 20x w/ PB   Bridges  3x10 + hip abd iso btb 3x10 20#   Pallof press  2x15 10# 2x15 12#   [place]on curl   2x8 8#   PB rollout 3 way   15x ea   Ther Ex      HEP review 5'     NuStep  10' L7 10' L7   HS S   3x30" B   Piriformis S Seated 30" Supine 3x30" B    DL / rack pull  9" step  5x 10#  5x 20#  5x 25#    Floor  10x 25#    Unilat row + trunk rot   2x12 B 12#                     Ther Activity      STS 5x 6x, 7x, 8x 20# 6x 25#  2x6 20#   Suitcase carry  2x100' B 25#    Gait Training                  Modalities
English

## 2025-08-21 ENCOUNTER — TELEPHONE (OUTPATIENT)
Age: 80
End: 2025-08-21

## (undated) DEVICE — CHLORAPREP HI-LITE 26ML ORANGE

## (undated) DEVICE — SUT VICRYL 3-0 SH 27 IN J416H

## (undated) DEVICE — SUT VICRYL 1 CT-1 27 IN J261H

## (undated) DEVICE — DRAPE EQUIPMENT RF WAND

## (undated) DEVICE — PAD GROUNDING ADULT

## (undated) DEVICE — SUT VICRYL 0 CT-1 27 IN J260H

## (undated) DEVICE — SUT MONOCRYL 4-0 PS-2 18 IN Y496G

## (undated) DEVICE — INTENDED FOR TISSUE SEPARATION, AND OTHER PROCEDURES THAT REQUIRE A SHARP SURGICAL BLADE TO PUNCTURE OR CUT.: Brand: BARD-PARKER SAFETY BLADES SIZE 15, STERILE

## (undated) DEVICE — BETHLEHEM UNIVERSAL MINOR GEN: Brand: CARDINAL HEALTH

## (undated) DEVICE — ADHESIVE SKIN HIGH VISCOSITY EXOFIN 1ML

## (undated) DEVICE — SUT VICRYL 2-0 REEL 54 IN J286G

## (undated) DEVICE — PENCIL ELECTROSURG E-Z CLEAN -0035H

## (undated) DEVICE — PENROSE DRAIN, 18 X 3 8: Brand: CARDINAL HEALTH

## (undated) DEVICE — VIAL DECANTER

## (undated) DEVICE — SUT PDS II 3-0 SH 27 IN Z316H

## (undated) DEVICE — LIGHT HANDLE COVER SLEEVE DISP BLUE STELLAR

## (undated) DEVICE — NEEDLE 22 G X 1 1/2 SAFETY

## (undated) DEVICE — TOWEL SURG XR DETECT GREEN STRL RFD

## (undated) DEVICE — GLOVE SRG BIOGEL ECLIPSE 7